# Patient Record
Sex: FEMALE | Race: WHITE | NOT HISPANIC OR LATINO | Employment: FULL TIME | RURAL
[De-identification: names, ages, dates, MRNs, and addresses within clinical notes are randomized per-mention and may not be internally consistent; named-entity substitution may affect disease eponyms.]

---

## 2018-04-04 ENCOUNTER — HISTORICAL (OUTPATIENT)
Dept: ADMINISTRATIVE | Facility: HOSPITAL | Age: 23
End: 2018-04-04

## 2018-04-09 LAB
LAB AP GENERAL CAT - HISTORICAL: NORMAL
LAB AP INTERPRETATION/RESULT - HISTORICAL: NORMAL
LAB AP SPECIMEN ADEQUACY - HISTORICAL: NORMAL
LAB AP SPECIMEN SUBMITTED - HISTORICAL: NORMAL

## 2019-01-16 ENCOUNTER — HISTORICAL (OUTPATIENT)
Dept: ADMINISTRATIVE | Facility: HOSPITAL | Age: 24
End: 2019-01-16

## 2019-01-18 LAB
LAB AP CLINICAL INFORMATION: NORMAL
LAB AP DIAGNOSIS - HISTORICAL: NORMAL
LAB AP GROSS PATHOLOGY - HISTORICAL: NORMAL
LAB AP SPECIMEN SUBMITTED - HISTORICAL: NORMAL

## 2019-12-03 ENCOUNTER — HISTORICAL (OUTPATIENT)
Dept: ADMINISTRATIVE | Facility: HOSPITAL | Age: 24
End: 2019-12-03

## 2019-12-10 LAB
LAB AP CLINICAL INFORMATION: NORMAL
LAB AP GENERAL CAT - HISTORICAL: NORMAL
LAB AP INTERPRETATION/RESULT - HISTORICAL: NORMAL
LAB AP SPECIMEN ADEQUACY - HISTORICAL: NORMAL
LAB AP SPECIMEN SUBMITTED - HISTORICAL: NORMAL

## 2021-01-05 ENCOUNTER — HISTORICAL (OUTPATIENT)
Dept: ADMINISTRATIVE | Facility: HOSPITAL | Age: 26
End: 2021-01-05

## 2021-01-12 LAB
LAB AP CLINICAL INFORMATION: NORMAL
LAB AP COMMENTS: NORMAL
LAB AP GENERAL CAT - HISTORICAL: NORMAL
LAB AP INTERPRETATION/RESULT - HISTORICAL: NEGATIVE
LAB AP SPECIMEN ADEQUACY - HISTORICAL: NORMAL
LAB AP SPECIMEN SUBMITTED - HISTORICAL: NORMAL

## 2021-05-26 ENCOUNTER — OFFICE VISIT (OUTPATIENT)
Dept: FAMILY MEDICINE | Facility: CLINIC | Age: 26
End: 2021-05-26
Payer: COMMERCIAL

## 2021-05-26 VITALS
BODY MASS INDEX: 27.79 KG/M2 | WEIGHT: 151 LBS | HEART RATE: 89 BPM | TEMPERATURE: 98 F | HEIGHT: 62 IN | SYSTOLIC BLOOD PRESSURE: 146 MMHG | DIASTOLIC BLOOD PRESSURE: 93 MMHG

## 2021-05-26 DIAGNOSIS — F90.2 ATTENTION DEFICIT HYPERACTIVITY DISORDER (ADHD), COMBINED TYPE: Primary | ICD-10-CM

## 2021-05-26 DIAGNOSIS — Z76.0 PRESCRIPTION REFILL: ICD-10-CM

## 2021-05-26 PROCEDURE — 99213 OFFICE O/P EST LOW 20 MIN: CPT | Mod: ,,, | Performed by: FAMILY MEDICINE

## 2021-05-26 PROCEDURE — 99213 PR OFFICE/OUTPT VISIT, EST, LEVL III, 20-29 MIN: ICD-10-PCS | Mod: ,,, | Performed by: FAMILY MEDICINE

## 2021-05-26 RX ORDER — DEXTROAMPHETAMINE SACCHARATE, AMPHETAMINE ASPARTATE, DEXTROAMPHETAMINE SULFATE AND AMPHETAMINE SULFATE 5; 5; 5; 5 MG/1; MG/1; MG/1; MG/1
20 TABLET ORAL 2 TIMES DAILY
COMMUNITY
Start: 2021-04-22 | End: 2021-05-26 | Stop reason: SDUPTHER

## 2021-05-26 RX ORDER — DEXTROAMPHETAMINE SACCHARATE, AMPHETAMINE ASPARTATE, DEXTROAMPHETAMINE SULFATE AND AMPHETAMINE SULFATE 5; 5; 5; 5 MG/1; MG/1; MG/1; MG/1
20 TABLET ORAL 2 TIMES DAILY
Qty: 60 TABLET | Refills: 0 | Status: SHIPPED | OUTPATIENT
Start: 2021-05-26 | End: 2021-06-22 | Stop reason: SDUPTHER

## 2021-05-26 RX ORDER — VALACYCLOVIR HYDROCHLORIDE 500 MG/1
500 TABLET, FILM COATED ORAL 2 TIMES DAILY
COMMUNITY
Start: 2021-05-10 | End: 2022-01-06 | Stop reason: SDUPTHER

## 2021-06-22 DIAGNOSIS — F90.2 ATTENTION DEFICIT HYPERACTIVITY DISORDER (ADHD), COMBINED TYPE: ICD-10-CM

## 2021-06-22 RX ORDER — DEXTROAMPHETAMINE SACCHARATE, AMPHETAMINE ASPARTATE, DEXTROAMPHETAMINE SULFATE AND AMPHETAMINE SULFATE 5; 5; 5; 5 MG/1; MG/1; MG/1; MG/1
20 TABLET ORAL 2 TIMES DAILY
Qty: 60 TABLET | Refills: 0 | Status: SHIPPED | OUTPATIENT
Start: 2021-06-22 | End: 2021-07-22 | Stop reason: SDUPTHER

## 2021-07-22 DIAGNOSIS — F90.2 ATTENTION DEFICIT HYPERACTIVITY DISORDER (ADHD), COMBINED TYPE: ICD-10-CM

## 2021-07-22 RX ORDER — DEXTROAMPHETAMINE SACCHARATE, AMPHETAMINE ASPARTATE, DEXTROAMPHETAMINE SULFATE AND AMPHETAMINE SULFATE 5; 5; 5; 5 MG/1; MG/1; MG/1; MG/1
20 TABLET ORAL 2 TIMES DAILY
Qty: 60 TABLET | Refills: 0 | Status: SHIPPED | OUTPATIENT
Start: 2021-07-22 | End: 2021-08-25 | Stop reason: SDUPTHER

## 2021-08-25 DIAGNOSIS — F90.2 ATTENTION DEFICIT HYPERACTIVITY DISORDER (ADHD), COMBINED TYPE: ICD-10-CM

## 2021-08-25 RX ORDER — DEXTROAMPHETAMINE SACCHARATE, AMPHETAMINE ASPARTATE, DEXTROAMPHETAMINE SULFATE AND AMPHETAMINE SULFATE 5; 5; 5; 5 MG/1; MG/1; MG/1; MG/1
20 TABLET ORAL 2 TIMES DAILY
Qty: 60 TABLET | Refills: 0 | Status: SHIPPED | OUTPATIENT
Start: 2021-08-25 | End: 2021-08-26 | Stop reason: SDUPTHER

## 2021-08-26 ENCOUNTER — OFFICE VISIT (OUTPATIENT)
Dept: FAMILY MEDICINE | Facility: CLINIC | Age: 26
End: 2021-08-26
Payer: COMMERCIAL

## 2021-08-26 VITALS — SYSTOLIC BLOOD PRESSURE: 146 MMHG | HEART RATE: 79 BPM | DIASTOLIC BLOOD PRESSURE: 88 MMHG

## 2021-08-26 DIAGNOSIS — F90.2 ATTENTION DEFICIT HYPERACTIVITY DISORDER (ADHD), COMBINED TYPE: Primary | Chronic | ICD-10-CM

## 2021-08-26 DIAGNOSIS — Z76.0 ISSUE OF REPEAT PRESCRIPTION: ICD-10-CM

## 2021-08-26 PROCEDURE — 99213 PR OFFICE/OUTPT VISIT, EST, LEVL III, 20-29 MIN: ICD-10-PCS | Mod: ,,, | Performed by: FAMILY MEDICINE

## 2021-08-26 PROCEDURE — 99213 OFFICE O/P EST LOW 20 MIN: CPT | Mod: ,,, | Performed by: FAMILY MEDICINE

## 2021-08-26 RX ORDER — DEXTROAMPHETAMINE SACCHARATE, AMPHETAMINE ASPARTATE, DEXTROAMPHETAMINE SULFATE AND AMPHETAMINE SULFATE 5; 5; 5; 5 MG/1; MG/1; MG/1; MG/1
20 TABLET ORAL 2 TIMES DAILY
Qty: 60 TABLET | Refills: 0 | Status: SHIPPED | OUTPATIENT
Start: 2021-08-26 | End: 2021-09-22 | Stop reason: SDUPTHER

## 2021-09-22 DIAGNOSIS — F90.2 ATTENTION DEFICIT HYPERACTIVITY DISORDER (ADHD), COMBINED TYPE: Chronic | ICD-10-CM

## 2021-09-22 RX ORDER — DEXTROAMPHETAMINE SACCHARATE, AMPHETAMINE ASPARTATE, DEXTROAMPHETAMINE SULFATE AND AMPHETAMINE SULFATE 5; 5; 5; 5 MG/1; MG/1; MG/1; MG/1
20 TABLET ORAL 2 TIMES DAILY
Qty: 60 TABLET | Refills: 0 | Status: SHIPPED | OUTPATIENT
Start: 2021-09-22 | End: 2021-10-20 | Stop reason: SDUPTHER

## 2021-10-20 DIAGNOSIS — F90.2 ATTENTION DEFICIT HYPERACTIVITY DISORDER (ADHD), COMBINED TYPE: Chronic | ICD-10-CM

## 2021-10-21 RX ORDER — DEXTROAMPHETAMINE SACCHARATE, AMPHETAMINE ASPARTATE, DEXTROAMPHETAMINE SULFATE AND AMPHETAMINE SULFATE 5; 5; 5; 5 MG/1; MG/1; MG/1; MG/1
20 TABLET ORAL 2 TIMES DAILY
Qty: 60 TABLET | Refills: 0 | Status: SHIPPED | OUTPATIENT
Start: 2021-10-21 | End: 2021-11-23 | Stop reason: SDUPTHER

## 2021-11-19 LAB
A1C: 4.5
CHOLEST SERPL-MSCNC: 173 MG/DL (ref 0–200)
HDLC SERPL-MCNC: 70 MG/DL (ref 35–70)
HIV: NORMAL
LDLC SERPL CALC-MCNC: 75 MG/DL (ref 0–160)
PAP SMEAR: NORMAL
TRIGL SERPL-MCNC: 142 MG/DL (ref 40–160)

## 2021-11-23 ENCOUNTER — OFFICE VISIT (OUTPATIENT)
Dept: FAMILY MEDICINE | Facility: CLINIC | Age: 26
End: 2021-11-23
Payer: COMMERCIAL

## 2021-11-23 VITALS
SYSTOLIC BLOOD PRESSURE: 125 MMHG | HEIGHT: 62 IN | HEART RATE: 75 BPM | BODY MASS INDEX: 27.79 KG/M2 | WEIGHT: 151 LBS | DIASTOLIC BLOOD PRESSURE: 82 MMHG

## 2021-11-23 DIAGNOSIS — F90.2 ATTENTION DEFICIT HYPERACTIVITY DISORDER (ADHD), COMBINED TYPE: Primary | Chronic | ICD-10-CM

## 2021-11-23 DIAGNOSIS — Z76.0 ISSUE OF REPEAT PRESCRIPTION: ICD-10-CM

## 2021-11-23 PROCEDURE — 99213 OFFICE O/P EST LOW 20 MIN: CPT | Mod: ,,, | Performed by: FAMILY MEDICINE

## 2021-11-23 PROCEDURE — 99213 PR OFFICE/OUTPT VISIT, EST, LEVL III, 20-29 MIN: ICD-10-PCS | Mod: ,,, | Performed by: FAMILY MEDICINE

## 2021-11-23 RX ORDER — DEXTROAMPHETAMINE SACCHARATE, AMPHETAMINE ASPARTATE, DEXTROAMPHETAMINE SULFATE AND AMPHETAMINE SULFATE 5; 5; 5; 5 MG/1; MG/1; MG/1; MG/1
20 TABLET ORAL 2 TIMES DAILY
Qty: 60 TABLET | Refills: 0 | Status: SHIPPED | OUTPATIENT
Start: 2021-11-23 | End: 2021-12-22 | Stop reason: SDUPTHER

## 2021-12-22 DIAGNOSIS — F90.2 ATTENTION DEFICIT HYPERACTIVITY DISORDER (ADHD), COMBINED TYPE: Chronic | ICD-10-CM

## 2021-12-22 RX ORDER — DEXTROAMPHETAMINE SACCHARATE, AMPHETAMINE ASPARTATE, DEXTROAMPHETAMINE SULFATE AND AMPHETAMINE SULFATE 5; 5; 5; 5 MG/1; MG/1; MG/1; MG/1
20 TABLET ORAL 2 TIMES DAILY
Qty: 60 TABLET | Refills: 0 | Status: SHIPPED | OUTPATIENT
Start: 2021-12-22 | End: 2022-01-24 | Stop reason: SDUPTHER

## 2022-01-06 ENCOUNTER — OFFICE VISIT (OUTPATIENT)
Dept: OBSTETRICS AND GYNECOLOGY | Facility: CLINIC | Age: 27
End: 2022-01-06
Payer: COMMERCIAL

## 2022-01-06 VITALS
BODY MASS INDEX: 30.22 KG/M2 | HEIGHT: 62 IN | SYSTOLIC BLOOD PRESSURE: 120 MMHG | RESPIRATION RATE: 16 BRPM | DIASTOLIC BLOOD PRESSURE: 76 MMHG | WEIGHT: 164.19 LBS

## 2022-01-06 DIAGNOSIS — Z30.40 ENCOUNTER FOR REFILL OF PRESCRIPTION FOR CONTRACEPTION: ICD-10-CM

## 2022-01-06 DIAGNOSIS — B00.9 HERPES SIMPLEX VIRUS (HSV) INFECTION: Chronic | ICD-10-CM

## 2022-01-06 DIAGNOSIS — Z12.4 SCREENING FOR MALIGNANT NEOPLASM OF THE CERVIX: ICD-10-CM

## 2022-01-06 DIAGNOSIS — Z01.419 ENCOUNTER FOR ANNUAL ROUTINE GYNECOLOGICAL EXAMINATION: ICD-10-CM

## 2022-01-06 DIAGNOSIS — Z86.19 HISTORY OF PCR DNA POSITIVE FOR HSV2: Primary | ICD-10-CM

## 2022-01-06 PROCEDURE — 88142 CYTOPATH C/V THIN LAYER: CPT | Mod: GCY | Performed by: OBSTETRICS & GYNECOLOGY

## 2022-01-06 PROCEDURE — 3008F PR BODY MASS INDEX (BMI) DOCUMENTED: ICD-10-PCS | Mod: CPTII,,, | Performed by: OBSTETRICS & GYNECOLOGY

## 2022-01-06 PROCEDURE — 3078F DIAST BP <80 MM HG: CPT | Mod: CPTII,,, | Performed by: OBSTETRICS & GYNECOLOGY

## 2022-01-06 PROCEDURE — 99395 PR PREVENTIVE VISIT,EST,18-39: ICD-10-PCS | Mod: S$PBB,,, | Performed by: OBSTETRICS & GYNECOLOGY

## 2022-01-06 PROCEDURE — 1159F MED LIST DOCD IN RCRD: CPT | Mod: CPTII,,, | Performed by: OBSTETRICS & GYNECOLOGY

## 2022-01-06 PROCEDURE — 1159F PR MEDICATION LIST DOCUMENTED IN MEDICAL RECORD: ICD-10-PCS | Mod: CPTII,,, | Performed by: OBSTETRICS & GYNECOLOGY

## 2022-01-06 PROCEDURE — 99395 PREV VISIT EST AGE 18-39: CPT | Mod: S$PBB,,, | Performed by: OBSTETRICS & GYNECOLOGY

## 2022-01-06 PROCEDURE — 3008F BODY MASS INDEX DOCD: CPT | Mod: CPTII,,, | Performed by: OBSTETRICS & GYNECOLOGY

## 2022-01-06 PROCEDURE — 3078F PR MOST RECENT DIASTOLIC BLOOD PRESSURE < 80 MM HG: ICD-10-PCS | Mod: CPTII,,, | Performed by: OBSTETRICS & GYNECOLOGY

## 2022-01-06 PROCEDURE — 1160F RVW MEDS BY RX/DR IN RCRD: CPT | Mod: CPTII,,, | Performed by: OBSTETRICS & GYNECOLOGY

## 2022-01-06 PROCEDURE — 3074F PR MOST RECENT SYSTOLIC BLOOD PRESSURE < 130 MM HG: ICD-10-PCS | Mod: CPTII,,, | Performed by: OBSTETRICS & GYNECOLOGY

## 2022-01-06 PROCEDURE — 1160F PR REVIEW ALL MEDS BY PRESCRIBER/CLIN PHARMACIST DOCUMENTED: ICD-10-PCS | Mod: CPTII,,, | Performed by: OBSTETRICS & GYNECOLOGY

## 2022-01-06 PROCEDURE — 3074F SYST BP LT 130 MM HG: CPT | Mod: CPTII,,, | Performed by: OBSTETRICS & GYNECOLOGY

## 2022-01-06 PROCEDURE — 99213 OFFICE O/P EST LOW 20 MIN: CPT | Mod: PBBFAC | Performed by: OBSTETRICS & GYNECOLOGY

## 2022-01-06 RX ORDER — VALACYCLOVIR HYDROCHLORIDE 500 MG/1
500 TABLET, FILM COATED ORAL 2 TIMES DAILY
Qty: 10 TABLET | Refills: 4 | Status: SHIPPED | OUTPATIENT
Start: 2022-01-06 | End: 2024-02-27

## 2022-01-06 RX ORDER — NORETHINDRONE ACETATE AND ETHINYL ESTRADIOL, AND FERROUS FUMARATE 1MG-20(24)
1 KIT ORAL DAILY
COMMUNITY
Start: 2021-12-11 | End: 2022-01-06

## 2022-01-06 RX ORDER — NORETHINDRONE ACETATE AND ETHINYL ESTRADIOL, AND FERROUS FUMARATE 1MG-20(24)
1 KIT ORAL DAILY
Qty: 28 CAPSULE | Refills: 11 | Status: SHIPPED | OUTPATIENT
Start: 2022-01-06 | End: 2022-02-07 | Stop reason: SDUPTHER

## 2022-01-06 NOTE — PROGRESS NOTES
Amy Rivera is a 26 y.o. female seen today for annual routine Pap and checkup.  Patient is also requesting refill of her contraception pill and Valtrex.      SUBJECTIVE:   Patient is without complaints.  Menses are regular lasting  3-4 days with mild dysmenorrhea.    Past Medical History:   Diagnosis Date    Abnormal Pap smear of cervix     LGSIL in 2019    ADHD     Herpes simplex virus (HSV) infection      Family History   Problem Relation Age of Onset    Lung cancer Paternal Grandfather     Cancer Paternal Grandfather     Breast cancer Maternal Grandmother     Hypertension Father      Past Surgical History:   Procedure Laterality Date    DILATION AND CURETTAGE OF UTERUS  2013      Current Outpatient Medications   Medication Sig Dispense Refill    dextroamphetamine-amphetamine (ADDERALL) 20 mg tablet Take 1 tablet (20 mg total) by mouth 2 (two) times daily. 60 tablet 0    norethindrone-e.estradioL-iron (TAYTULLA) 1 mg-20 mcg (24)/75 mg (4) Cap Take 1 tablet by mouth once daily. 28 capsule 11    valACYclovir (VALTREX) 500 MG tablet Take 1 tablet (500 mg total) by mouth 2 (two) times daily. for 5 days 10 tablet 4     No current facility-administered medications for this visit.     Allergies: Codeine   Patient's last menstrual period was 12/10/2021.  Review of Systems   Constitutional: Negative for activity change, appetite change, chills, fatigue, fever and unexpected weight change.   Eyes: Negative for visual disturbance.   Respiratory: Negative for cough, shortness of breath and wheezing.    Cardiovascular: Negative for chest pain and claudication.   Gastrointestinal: Negative for abdominal pain, change in bowel habit, constipation, diarrhea, vomiting, reflux and change in bowel habit.   Endocrine: Negative for cold intolerance, heat intolerance and polydipsia.   Genitourinary: Negative for bladder incontinence, difficulty urinating, dysuria, enuresis, flank pain, frequency, menstrual irregularity,  "menstrual problem, pelvic pain, urgency and vaginal dryness.   Musculoskeletal: Negative for back pain.   Integumentary:  Negative for breast mass, breast discharge and breast tenderness.   Neurological: Negative for headaches.   Breast: Negative for mass and tenderness       OBJECTIVE:   /76   Resp 16   Ht 5' 2" (1.575 m)   Wt 74.5 kg (164 lb 3.2 oz)   LMP 12/10/2021 Comment: x4 days; regular cycles  BMI 30.03 kg/m²   Physical Exam  Vitals reviewed. Exam conducted with a chaperone present.   Constitutional:       Appearance: Normal appearance.   HENT:      Head: Normocephalic and atraumatic.      Mouth/Throat:      Mouth: Mucous membranes are moist.   Eyes:      Extraocular Movements: Extraocular movements intact.      Pupils: Pupils are equal, round, and reactive to light.   Cardiovascular:      Rate and Rhythm: Normal rate and regular rhythm.      Pulses: Normal pulses.      Heart sounds: Normal heart sounds.   Pulmonary:      Effort: Pulmonary effort is normal.      Breath sounds: Normal breath sounds.   Abdominal:      General: Abdomen is flat. Bowel sounds are normal.      Palpations: Abdomen is soft.   Genitourinary:     General: Normal vulva.      Exam position: Lithotomy position.      Vagina: Normal. No vaginal discharge.      Cervix: Normal.      Uterus: Normal.       Adnexa: Right adnexa normal and left adnexa normal.        Right: No tenderness.          Left: No tenderness.        Comments: Pap smear obtained  Musculoskeletal:         General: Normal range of motion.      Cervical back: Normal range of motion.   Skin:     General: Skin is warm and dry.   Neurological:      General: No focal deficit present.      Mental Status: She is alert and oriented to person, place, and time.   Psychiatric:         Mood and Affect: Mood normal.         Behavior: Behavior normal.         Thought Content: Thought content normal.         Judgment: Judgment normal.            ASSESSMENT/PLAN:  History of PCR " DNA positive for HSV2  -     valACYclovir (VALTREX) 500 MG tablet; Take 1 tablet (500 mg total) by mouth 2 (two) times daily. for 5 days  Dispense: 10 tablet; Refill: 4    Screening for malignant neoplasm of the cervix  -     ThinPrep Pap Test    Encounter for annual routine gynecological examination    Encounter for refill of prescription for contraception  -     norethindrone-e.estradioL-iron (TAYTULLA) 1 mg-20 mcg (24)/75 mg (4) Cap; Take 1 tablet by mouth once daily.  Dispense: 28 capsule; Refill: 11    Herpes simplex virus (HSV) infection

## 2022-01-07 LAB
GH SERPL-MCNC: NORMAL NG/ML
INSULIN SERPL-ACNC: NORMAL U[IU]/ML
LAB AP CLINICAL INFORMATION: NORMAL
LAB AP GYN INTERPRETATION: NEGATIVE
LAB AP PAP DISCLAIMER COMMENTS: NORMAL
RENIN PLAS-CCNC: NORMAL NG/ML/H

## 2022-01-24 DIAGNOSIS — F90.2 ATTENTION DEFICIT HYPERACTIVITY DISORDER (ADHD), COMBINED TYPE: Chronic | ICD-10-CM

## 2022-01-25 RX ORDER — DEXTROAMPHETAMINE SACCHARATE, AMPHETAMINE ASPARTATE, DEXTROAMPHETAMINE SULFATE AND AMPHETAMINE SULFATE 5; 5; 5; 5 MG/1; MG/1; MG/1; MG/1
20 TABLET ORAL 2 TIMES DAILY
Qty: 60 TABLET | Refills: 0 | Status: SHIPPED | OUTPATIENT
Start: 2022-01-25 | End: 2022-02-24 | Stop reason: SDUPTHER

## 2022-02-07 DIAGNOSIS — Z30.40 ENCOUNTER FOR REFILL OF PRESCRIPTION FOR CONTRACEPTION: ICD-10-CM

## 2022-02-07 NOTE — TELEPHONE ENCOUNTER
Pt requesting refill of ocp. After speaking with pharmacy, they said pt is out of refills. Verbal given to pharmacy.

## 2022-02-24 ENCOUNTER — OFFICE VISIT (OUTPATIENT)
Dept: FAMILY MEDICINE | Facility: CLINIC | Age: 27
End: 2022-02-24
Payer: COMMERCIAL

## 2022-02-24 VITALS — SYSTOLIC BLOOD PRESSURE: 140 MMHG | HEART RATE: 71 BPM | DIASTOLIC BLOOD PRESSURE: 90 MMHG

## 2022-02-24 DIAGNOSIS — F90.2 ATTENTION DEFICIT HYPERACTIVITY DISORDER (ADHD), COMBINED TYPE: Primary | Chronic | ICD-10-CM

## 2022-02-24 DIAGNOSIS — Z13.1 SCREENING FOR DIABETES MELLITUS: ICD-10-CM

## 2022-02-24 DIAGNOSIS — Z76.0 ISSUE OF REPEAT PRESCRIPTION: ICD-10-CM

## 2022-02-24 PROCEDURE — 3077F PR MOST RECENT SYSTOLIC BLOOD PRESSURE >= 140 MM HG: ICD-10-PCS | Mod: CPTII,,, | Performed by: FAMILY MEDICINE

## 2022-02-24 PROCEDURE — 1159F MED LIST DOCD IN RCRD: CPT | Mod: CPTII,,, | Performed by: FAMILY MEDICINE

## 2022-02-24 PROCEDURE — 1159F PR MEDICATION LIST DOCUMENTED IN MEDICAL RECORD: ICD-10-PCS | Mod: CPTII,,, | Performed by: FAMILY MEDICINE

## 2022-02-24 PROCEDURE — 3080F DIAST BP >= 90 MM HG: CPT | Mod: CPTII,,, | Performed by: FAMILY MEDICINE

## 2022-02-24 PROCEDURE — 3080F PR MOST RECENT DIASTOLIC BLOOD PRESSURE >= 90 MM HG: ICD-10-PCS | Mod: CPTII,,, | Performed by: FAMILY MEDICINE

## 2022-02-24 PROCEDURE — 3077F SYST BP >= 140 MM HG: CPT | Mod: CPTII,,, | Performed by: FAMILY MEDICINE

## 2022-02-24 PROCEDURE — 99213 OFFICE O/P EST LOW 20 MIN: CPT | Mod: ,,, | Performed by: FAMILY MEDICINE

## 2022-02-24 PROCEDURE — 99213 PR OFFICE/OUTPT VISIT, EST, LEVL III, 20-29 MIN: ICD-10-PCS | Mod: ,,, | Performed by: FAMILY MEDICINE

## 2022-02-24 RX ORDER — DEXTROAMPHETAMINE SACCHARATE, AMPHETAMINE ASPARTATE, DEXTROAMPHETAMINE SULFATE AND AMPHETAMINE SULFATE 5; 5; 5; 5 MG/1; MG/1; MG/1; MG/1
20 TABLET ORAL 2 TIMES DAILY
Qty: 60 TABLET | Refills: 0 | Status: SHIPPED | OUTPATIENT
Start: 2022-02-24 | End: 2022-03-24 | Stop reason: SDUPTHER

## 2022-02-24 NOTE — PROGRESS NOTES
Dennys Reed MD   [facility]  MEDICAL GROUP [facility] MEDICINE  [address] [place] [address]  [phone]      PATIENT NAME: Amy Rivera  : 1995  DATE: 22  MRN: [id number]      Billing Provider: Dennys Reed MD  Level of Service:   Patient PCP Information       Provider PCP Type    Dennys Reed MD General            Reason for Visit / Chief Complaint: Follow-up (Medication refills )       Update PCP  Update Chief Complaint         History of Present Illness / Problem Focused Workflow     Amy Rivera presents to the clinic with Follow-up (Medication refills )       27 yo WF treated for ADHD.  Doing well on current dosage of medication.  Here today for refill.  No new/acute complaints.        Review of Systems     Review of Systems   Constitutional: Negative for activity change, chills and fever.   HENT: Negative for sore throat.    Eyes: Negative for pain.   Respiratory: Negative for cough, chest tightness and shortness of breath.    Cardiovascular: Negative for chest pain and palpitations.   Gastrointestinal: Negative for abdominal pain.   Neurological: Negative for dizziness, syncope and weakness.   Psychiatric/Behavioral: Negative for confusion.        Medical / Social / Family History     Past Medical History:   Diagnosis Date    Abnormal Pap smear of cervix     LGSIL in 2019    ADHD     Herpes simplex virus (HSV) infection        Past Surgical History:   Procedure Laterality Date    DILATION AND CURETTAGE OF UTERUS         Social History    reports that she has never smoked. She has never used smokeless tobacco. She reports previous alcohol use. She reports that she does not use drugs.   Social History     Tobacco Use    Smoking status: Never Smoker    Smokeless tobacco: Never Used   Substance Use Topics    Alcohol use: Not Currently    Drug use: Never       Family History  Family History   Problem Relation Age of Onset     Lung cancer Paternal Grandfather     Cancer Paternal Grandfather     Breast cancer Maternal Grandmother     Hypertension Father        Medications and Allergies     Medications  Outpatient Medications Marked as Taking for the 2/24/22 encounter (Office Visit) with Dennys Reed MD   Medication Sig Dispense Refill    norethindrone-e.estradioL-iron (TAYTULLA) 1 mg-20 mcg (24)/75 mg (4) Cap Take 1 tablet by mouth once daily. 28 capsule 11    valACYclovir (VALTREX) 500 MG tablet Take 1 tablet (500 mg total) by mouth 2 (two) times daily. for 5 days 10 tablet 4    [DISCONTINUED] dextroamphetamine-amphetamine (ADDERALL) 20 mg tablet Take 1 tablet (20 mg total) by mouth 2 (two) times daily. 60 tablet 0       Allergies  Review of patient's allergies indicates:   Allergen Reactions    Codeine        Physical Examination     Vitals:    02/24/22 1512   BP: (!) 140/90   Pulse: 71     Physical Exam  Vitals reviewed.   Constitutional:       Appearance: Normal appearance.   HENT:      Head: Normocephalic and atraumatic.   Eyes:      Extraocular Movements: Extraocular movements intact.      Conjunctiva/sclera: Conjunctivae normal.      Pupils: Pupils are equal, round, and reactive to light.   Cardiovascular:      Rate and Rhythm: Normal rate and regular rhythm.      Heart sounds: Normal heart sounds.   Pulmonary:      Effort: Pulmonary effort is normal.      Breath sounds: Normal breath sounds.   Musculoskeletal:         General: Normal range of motion.      Cervical back: Normal range of motion.   Skin:     General: Skin is warm and dry.   Neurological:      General: No focal deficit present.      Mental Status: She is alert and oriented to person, place, and time.   Psychiatric:         Mood and Affect: Mood normal.         Behavior: Behavior normal.          Assessment and Plan (including Health Maintenance)      Problem List  Smart Sets  Document Outside HM   :    Plan:         Health Maintenance Due   Topic Date Due     Hemoglobin A1c  02/24/2022       Problem List Items Addressed This Visit        Psychiatric    Attention deficit hyperactivity disorder (ADHD), combined type - Primary (Chronic)    Relevant Medications    dextroamphetamine-amphetamine (ADDERALL) 20 mg tablet      Other Visit Diagnoses     Screening for diabetes mellitus        Relevant Orders    Hemoglobin A1C    Issue of repeat prescription              Health Maintenance Topics with due status: Not Due       Topic Last Completion Date    TETANUS VACCINE 11/15/2018    Pap Smear 01/12/2021    COVID-19 Vaccine 12/22/2021       Future Appointments   Date Time Provider Department Center   2/28/2023  9:00 AM Dennys Reed MD [facility]            Signature:  Dennys Reed MD  [facility]  MEDICAL GROUP [facility] - FAMILY MEDICINE  [address] [place] [address]  [phone]    Date of encounter: 2/24/22

## 2022-03-24 DIAGNOSIS — F90.2 ATTENTION DEFICIT HYPERACTIVITY DISORDER (ADHD), COMBINED TYPE: Chronic | ICD-10-CM

## 2022-03-24 RX ORDER — DEXTROAMPHETAMINE SACCHARATE, AMPHETAMINE ASPARTATE, DEXTROAMPHETAMINE SULFATE AND AMPHETAMINE SULFATE 5; 5; 5; 5 MG/1; MG/1; MG/1; MG/1
20 TABLET ORAL 2 TIMES DAILY
Qty: 60 TABLET | Refills: 0 | Status: SHIPPED | OUTPATIENT
Start: 2022-03-24 | End: 2022-04-28 | Stop reason: SDUPTHER

## 2022-04-28 DIAGNOSIS — F90.2 ATTENTION DEFICIT HYPERACTIVITY DISORDER (ADHD), COMBINED TYPE: Chronic | ICD-10-CM

## 2022-04-28 RX ORDER — DEXTROAMPHETAMINE SACCHARATE, AMPHETAMINE ASPARTATE, DEXTROAMPHETAMINE SULFATE AND AMPHETAMINE SULFATE 5; 5; 5; 5 MG/1; MG/1; MG/1; MG/1
20 TABLET ORAL 2 TIMES DAILY
Qty: 60 TABLET | Refills: 0 | Status: SHIPPED | OUTPATIENT
Start: 2022-04-28 | End: 2022-05-31 | Stop reason: SDUPTHER

## 2022-05-10 RX ORDER — NORETHINDRONE ACETATE AND ETHINYL ESTRADIOL, AND FERROUS FUMARATE 1MG-20(24)
1 KIT ORAL DAILY
Qty: 28 CAPSULE | Refills: 11 | Status: SHIPPED | OUTPATIENT
Start: 2022-05-10 | End: 2024-02-27

## 2022-05-31 ENCOUNTER — OFFICE VISIT (OUTPATIENT)
Dept: FAMILY MEDICINE | Facility: CLINIC | Age: 27
End: 2022-05-31
Payer: COMMERCIAL

## 2022-05-31 VITALS
SYSTOLIC BLOOD PRESSURE: 128 MMHG | BODY MASS INDEX: 30.18 KG/M2 | HEART RATE: 100 BPM | DIASTOLIC BLOOD PRESSURE: 84 MMHG | WEIGHT: 164 LBS | HEIGHT: 62 IN

## 2022-05-31 DIAGNOSIS — Z76.0 ISSUE OF REPEAT PRESCRIPTION: ICD-10-CM

## 2022-05-31 DIAGNOSIS — F90.2 ATTENTION DEFICIT HYPERACTIVITY DISORDER (ADHD), COMBINED TYPE: Primary | Chronic | ICD-10-CM

## 2022-05-31 PROCEDURE — 3079F DIAST BP 80-89 MM HG: CPT | Mod: CPTII,,, | Performed by: FAMILY MEDICINE

## 2022-05-31 PROCEDURE — 3008F BODY MASS INDEX DOCD: CPT | Mod: CPTII,,, | Performed by: FAMILY MEDICINE

## 2022-05-31 PROCEDURE — 1159F PR MEDICATION LIST DOCUMENTED IN MEDICAL RECORD: ICD-10-PCS | Mod: CPTII,,, | Performed by: FAMILY MEDICINE

## 2022-05-31 PROCEDURE — 3008F PR BODY MASS INDEX (BMI) DOCUMENTED: ICD-10-PCS | Mod: CPTII,,, | Performed by: FAMILY MEDICINE

## 2022-05-31 PROCEDURE — 99213 PR OFFICE/OUTPT VISIT, EST, LEVL III, 20-29 MIN: ICD-10-PCS | Mod: ,,, | Performed by: FAMILY MEDICINE

## 2022-05-31 PROCEDURE — 1160F RVW MEDS BY RX/DR IN RCRD: CPT | Mod: CPTII,,, | Performed by: FAMILY MEDICINE

## 2022-05-31 PROCEDURE — 3079F PR MOST RECENT DIASTOLIC BLOOD PRESSURE 80-89 MM HG: ICD-10-PCS | Mod: CPTII,,, | Performed by: FAMILY MEDICINE

## 2022-05-31 PROCEDURE — 1159F MED LIST DOCD IN RCRD: CPT | Mod: CPTII,,, | Performed by: FAMILY MEDICINE

## 2022-05-31 PROCEDURE — 3074F SYST BP LT 130 MM HG: CPT | Mod: CPTII,,, | Performed by: FAMILY MEDICINE

## 2022-05-31 PROCEDURE — 1160F PR REVIEW ALL MEDS BY PRESCRIBER/CLIN PHARMACIST DOCUMENTED: ICD-10-PCS | Mod: CPTII,,, | Performed by: FAMILY MEDICINE

## 2022-05-31 PROCEDURE — 99213 OFFICE O/P EST LOW 20 MIN: CPT | Mod: ,,, | Performed by: FAMILY MEDICINE

## 2022-05-31 PROCEDURE — 3074F PR MOST RECENT SYSTOLIC BLOOD PRESSURE < 130 MM HG: ICD-10-PCS | Mod: CPTII,,, | Performed by: FAMILY MEDICINE

## 2022-05-31 RX ORDER — DEXTROAMPHETAMINE SACCHARATE, AMPHETAMINE ASPARTATE, DEXTROAMPHETAMINE SULFATE AND AMPHETAMINE SULFATE 5; 5; 5; 5 MG/1; MG/1; MG/1; MG/1
20 TABLET ORAL 2 TIMES DAILY
Qty: 60 TABLET | Refills: 0 | Status: SHIPPED | OUTPATIENT
Start: 2022-05-31 | End: 2022-06-28 | Stop reason: SDUPTHER

## 2022-05-31 NOTE — PROGRESS NOTES
Answers for HPI/ROS submitted by the patient on 2022  activity change: No  unexpected weight change: No  neck pain: No  hearing loss: No  rhinorrhea: No  trouble swallowing: No  eye discharge: No  visual disturbance: No  chest tightness: No  wheezing: No  chest pain: No  palpitations: No  blood in stool: No  constipation: No  vomiting: No  diarrhea: No  polydipsia: No  polyuria: No  difficulty urinating: No  hematuria: No  menstrual problem: No  dysuria: No  joint swelling: No  arthralgias: No  headaches: No  weakness: No  confusion: No  dysphoric mood: No       Dennys Reed MD   [facility] [address]  [phone]      PATIENT NAME: Amy Rivera  : 1995  DATE: 22  MRN: [id number]      Billing Provider: Dennys Reed MD  Level of Service:   Patient PCP Information       Provider PCP Type    Dennys Reed MD General            Reason for Visit / Chief Complaint: Medication Refill       Update PCP  Update Chief Complaint         History of Present Illness / Problem Focused Workflow     Amy Rivera presents to the clinic with Medication Refill       28 yo WF here for follow up treatment of ADD.  Says that she is doing well on current dosage of medication and has no new/acute complaints.  Here for refill only.    Medication Refill  Pertinent negatives include no arthralgias, chest pain, headaches, joint swelling, neck pain, vomiting or weakness.       Review of Systems     Review of Systems   Constitutional: Negative for activity change and unexpected weight change.   HENT: Negative for hearing loss, rhinorrhea and trouble swallowing.    Eyes: Negative for discharge and visual disturbance.   Respiratory: Negative for chest tightness and wheezing.    Cardiovascular: Negative for chest pain and palpitations.   Gastrointestinal: Negative for blood in stool, constipation, diarrhea and vomiting.    Endocrine: Negative for polydipsia and polyuria.   Genitourinary: Negative for difficulty urinating, dysuria, hematuria and menstrual problem.   Musculoskeletal: Negative for arthralgias, joint swelling and neck pain.   Neurological: Negative for weakness and headaches.   Psychiatric/Behavioral: Negative for confusion and dysphoric mood.        Medical / Social / Family History     Past Medical History:   Diagnosis Date    Abnormal Pap smear of cervix     LGSIL in 2019    ADHD     Herpes simplex virus (HSV) infection        Past Surgical History:   Procedure Laterality Date    DILATION AND CURETTAGE OF UTERUS  2013       Social History    reports that she has never smoked. She has never used smokeless tobacco. She reports previous alcohol use. She reports that she does not use drugs.   Social History     Tobacco Use    Smoking status: Never Smoker    Smokeless tobacco: Never Used   Substance Use Topics    Alcohol use: Not Currently    Drug use: Never       Family History  Family History   Problem Relation Age of Onset    Lung cancer Paternal Grandfather     Cancer Paternal Grandfather     Breast cancer Maternal Grandmother     Hypertension Father        Medications and Allergies     Medications  No outpatient medications have been marked as taking for the 5/31/22 encounter (Office Visit) with Dennys Reed MD.       Allergies  Review of patient's allergies indicates:   Allergen Reactions    Codeine        Physical Examination     Vitals:    05/31/22 0937   BP: 128/84   Pulse: 100     Physical Exam  Vitals reviewed.   Constitutional:       Appearance: Normal appearance.   HENT:      Head: Normocephalic and atraumatic.   Eyes:      Extraocular Movements: Extraocular movements intact.      Conjunctiva/sclera: Conjunctivae normal.      Pupils: Pupils are equal, round, and reactive to light.   Cardiovascular:      Rate and Rhythm: Normal rate and regular rhythm.      Heart sounds: Normal heart sounds.    Pulmonary:      Effort: Pulmonary effort is normal.      Breath sounds: Normal breath sounds.   Musculoskeletal:         General: Normal range of motion.      Cervical back: Normal range of motion.   Skin:     General: Skin is warm and dry.   Neurological:      General: No focal deficit present.      Mental Status: She is alert and oriented to person, place, and time.   Psychiatric:         Mood and Affect: Mood normal.         Behavior: Behavior normal.          Assessment and Plan (including Health Maintenance)      Problem List  Smart Sets  Document Outside HM   :    Plan:         Health Maintenance Due   Topic Date Due    Hemoglobin A1c  02/24/2022    COVID-19 Vaccine (3 - Booster for Moderna series) 05/22/2022       Problem List Items Addressed This Visit        Psychiatric    Attention deficit hyperactivity disorder (ADHD), combined type - Primary (Chronic)    Relevant Medications    dextroamphetamine-amphetamine (ADDERALL) 20 mg tablet      Other Visit Diagnoses     Issue of repeat prescription              Health Maintenance Topics with due status: Not Due       Topic Last Completion Date    Influenza Vaccine 10/29/2018    TETANUS VACCINE 11/15/2018    Pap Smear 01/06/2022       No future appointments.         Signature:  MD THOM[facility]  MEDICAL GROUP [facility] FAMILY MEDICINE  [address] [place] [id number]  [phone]    Date of encounter: 5/31/22

## 2022-06-28 DIAGNOSIS — F90.2 ATTENTION DEFICIT HYPERACTIVITY DISORDER (ADHD), COMBINED TYPE: Chronic | ICD-10-CM

## 2022-06-28 RX ORDER — DEXTROAMPHETAMINE SACCHARATE, AMPHETAMINE ASPARTATE, DEXTROAMPHETAMINE SULFATE AND AMPHETAMINE SULFATE 5; 5; 5; 5 MG/1; MG/1; MG/1; MG/1
20 TABLET ORAL 2 TIMES DAILY
Qty: 60 TABLET | Refills: 0 | Status: SHIPPED | OUTPATIENT
Start: 2022-06-28 | End: 2022-07-28 | Stop reason: SDUPTHER

## 2022-07-28 DIAGNOSIS — F90.2 ATTENTION DEFICIT HYPERACTIVITY DISORDER (ADHD), COMBINED TYPE: Chronic | ICD-10-CM

## 2022-07-28 RX ORDER — DEXTROAMPHETAMINE SACCHARATE, AMPHETAMINE ASPARTATE, DEXTROAMPHETAMINE SULFATE AND AMPHETAMINE SULFATE 5; 5; 5; 5 MG/1; MG/1; MG/1; MG/1
20 TABLET ORAL 2 TIMES DAILY
Qty: 60 TABLET | Refills: 0 | Status: SHIPPED | OUTPATIENT
Start: 2022-07-28 | End: 2022-08-16 | Stop reason: SDUPTHER

## 2022-08-16 DIAGNOSIS — F90.2 ATTENTION DEFICIT HYPERACTIVITY DISORDER (ADHD), COMBINED TYPE: Chronic | ICD-10-CM

## 2022-08-16 RX ORDER — DEXTROAMPHETAMINE SACCHARATE, AMPHETAMINE ASPARTATE, DEXTROAMPHETAMINE SULFATE AND AMPHETAMINE SULFATE 5; 5; 5; 5 MG/1; MG/1; MG/1; MG/1
20 TABLET ORAL 2 TIMES DAILY
Qty: 60 TABLET | Refills: 0 | Status: SHIPPED | OUTPATIENT
Start: 2022-08-16 | End: 2024-02-27 | Stop reason: SDUPTHER

## 2024-02-27 ENCOUNTER — OFFICE VISIT (OUTPATIENT)
Dept: FAMILY MEDICINE | Facility: CLINIC | Age: 29
End: 2024-02-27
Payer: COMMERCIAL

## 2024-02-27 VITALS
SYSTOLIC BLOOD PRESSURE: 155 MMHG | DIASTOLIC BLOOD PRESSURE: 93 MMHG | WEIGHT: 185 LBS | HEART RATE: 121 BPM | HEIGHT: 62 IN | BODY MASS INDEX: 34.04 KG/M2

## 2024-02-27 DIAGNOSIS — F90.2 ATTENTION DEFICIT HYPERACTIVITY DISORDER (ADHD), COMBINED TYPE: Primary | Chronic | ICD-10-CM

## 2024-02-27 DIAGNOSIS — R79.9 ABNORMAL BLOOD CHEMISTRY: ICD-10-CM

## 2024-02-27 DIAGNOSIS — Z79.899 PRESCRIPTION DRUG STARTED: ICD-10-CM

## 2024-02-27 DIAGNOSIS — R03.0 ELEVATED BLOOD PRESSURE READING: ICD-10-CM

## 2024-02-27 PROCEDURE — 99214 OFFICE O/P EST MOD 30 MIN: CPT | Mod: ,,, | Performed by: FAMILY MEDICINE

## 2024-02-27 PROCEDURE — 83036 HEMOGLOBIN GLYCOSYLATED A1C: CPT | Mod: ,,, | Performed by: CLINICAL MEDICAL LABORATORY

## 2024-02-27 PROCEDURE — 80048 BASIC METABOLIC PNL TOTAL CA: CPT | Mod: ,,, | Performed by: CLINICAL MEDICAL LABORATORY

## 2024-02-27 PROCEDURE — 84443 ASSAY THYROID STIM HORMONE: CPT | Mod: ,,, | Performed by: CLINICAL MEDICAL LABORATORY

## 2024-02-27 PROCEDURE — 85025 COMPLETE CBC W/AUTO DIFF WBC: CPT | Mod: ,,, | Performed by: CLINICAL MEDICAL LABORATORY

## 2024-02-27 RX ORDER — DEXTROAMPHETAMINE SACCHARATE, AMPHETAMINE ASPARTATE, DEXTROAMPHETAMINE SULFATE AND AMPHETAMINE SULFATE 5; 5; 5; 5 MG/1; MG/1; MG/1; MG/1
20 TABLET ORAL 2 TIMES DAILY
Qty: 60 TABLET | Refills: 0 | Status: SHIPPED | OUTPATIENT
Start: 2024-02-27 | End: 2024-03-26 | Stop reason: SDUPTHER

## 2024-02-27 NOTE — PROGRESS NOTES
Dennys Reed MD   [facility]  MEDICAL GROUP [address] [address]  [phone]      PATIENT NAME: Amy Fleming  : 1995  DATE: 24  MRN: [id number]      Billing Provider: Dennys Reed MD  Level of Service: [place] OFFICE/OUTPT VISIT, EST, LEVL IV, 30-39 MIN  Patient PCP Information       Provider PCP Type    Dennys Reed MD General            Reason for Visit / Chief Complaint: check up (Discuss starting ADHD med again)       Update PCP  Update Chief Complaint         History of Present Illness / Problem Focused Workflow     Amy Fleming presents to the clinic with check up (Discuss starting ADHD med again)       Was previously on adderall for ADHD.  She did well with no adverse side effects.  She stopped the medication when she was trying to get pregnant.  She did have a child and now has 4 children.  She works full time and is in school.  She is having difficulty with focus/attention/completing tasks.  Her OB/gyn gave her a trial of wellbutrin, but she says that it was not effective.  She is wanting to get back on her previous medication.      She monitors BP on a regular basis and is usually 120-130s systolic.        Review of Systems     Review of Systems   Constitutional:  Negative for activity change and unexpected weight change.   HENT:  Negative for hearing loss, rhinorrhea and trouble swallowing.    Eyes:  Negative for discharge and visual disturbance.   Respiratory:  Negative for chest tightness and wheezing.    Cardiovascular:  Negative for chest pain and palpitations.   Gastrointestinal:  Negative for blood in stool, constipation, diarrhea and vomiting.   Endocrine: Negative for polydipsia and polyuria.   Genitourinary:  Negative for difficulty urinating, dysuria, hematuria and menstrual problem.   Musculoskeletal:  Negative for arthralgias, joint swelling and neck pain.   Neurological:  Negative for weakness  and headaches.   Psychiatric/Behavioral:  Positive for decreased concentration. Negative for confusion and dysphoric mood.         Medical / Social / Family History     Past Medical History:   Diagnosis Date    Abnormal Pap smear of cervix     LGSIL in 2019    ADHD     Herpes simplex virus (HSV) infection        Past Surgical History:   Procedure Laterality Date     SECTION  2023    DILATION AND CURETTAGE OF UTERUS         Social History    reports that she has never smoked. She has never used smokeless tobacco. She reports that she does not currently use alcohol. She reports that she does not use drugs.   Social History     Tobacco Use    Smoking status: Never    Smokeless tobacco: Never   Substance Use Topics    Alcohol use: Not Currently    Drug use: Never       Family History  Family History   Problem Relation Age of Onset    Lung cancer Paternal Grandfather     Cancer Paternal Grandfather     Breast cancer Maternal Grandmother     Hypertension Father     Mental illness Brother        Medications and Allergies     Medications  No outpatient medications have been marked as taking for the 24 encounter (Office Visit) with Dennys Rede MD.       Allergies  Review of patient's allergies indicates:   Allergen Reactions    Codeine        Physical Examination     Vitals:    24 1349   BP: (!) 155/93   Pulse: (!) 121     Physical Exam  Vitals reviewed.   Constitutional:       Appearance: Normal appearance.   HENT:      Head: Normocephalic and atraumatic.   Eyes:      Extraocular Movements: Extraocular movements intact.      Conjunctiva/sclera: Conjunctivae normal.      Pupils: Pupils are equal, round, and reactive to light.   Cardiovascular:      Rate and Rhythm: Normal rate and regular rhythm.      Heart sounds: Normal heart sounds.   Pulmonary:      Effort: Pulmonary effort is normal.      Breath sounds: Normal breath sounds.   Musculoskeletal:         General: Normal range of motion.       Cervical back: Normal range of motion.   Skin:     General: Skin is warm and dry.   Neurological:      General: No focal deficit present.      Mental Status: She is alert and oriented to person, place, and time.   Psychiatric:         Mood and Affect: Mood normal.         Behavior: Behavior normal.         Thought Content: Thought content normal.         Judgment: Judgment normal.          Assessment and Plan (including Health Maintenance)      Problem List  Smart Sets  Document Outside HM   :    Plan: will start back on med at previous dosage.  She will monitor her blood pressure.  We will get some baseline labs today.          Health Maintenance Due   Topic Date Due    Hepatitis B Vaccines (1 of 3 - 3-dose series) Never done    MMR Vaccines (1 of 1 - Standard series) Never done    Varicella Vaccines (1 of 2 - 2-dose childhood series) Never done    DTaP/Tdap/Td Vaccines (2 - Td or Tdap) 12/13/2018    Hemoglobin A1c  02/24/2022    Influenza Vaccine (1) 09/01/2023    COVID-19 Vaccine (3 - 2023-24 season) 09/01/2023       Problem List Items Addressed This Visit          Psychiatric    Attention deficit hyperactivity disorder (ADHD), combined type - Primary (Chronic)    Relevant Medications    dextroamphetamine-amphetamine (ADDERALL) 20 mg tablet     Other Visit Diagnoses       Prescription drug started        Elevated blood pressure reading        Relevant Orders    CBC Auto Differential    Basic Metabolic Panel    Hemoglobin A1C    TSH    Abnormal blood chemistry        Relevant Orders    Hemoglobin A1C            Health Maintenance Topics with due status: Not Due       Topic Last Completion Date    TETANUS VACCINE 11/15/2018    Pap Smear 01/06/2022       No future appointments.         Signature:  MD THOMAS [facility]  MEDICAL GROUP [facility] FAMILY [address] [address]  [phone]    Date of encounter: 2/27/24

## 2024-02-28 LAB
ANION GAP SERPL CALCULATED.3IONS-SCNC: 13 MMOL/L (ref 7–16)
BASOPHILS # BLD AUTO: 0.03 K/UL (ref 0–0.2)
BASOPHILS NFR BLD AUTO: 0.4 % (ref 0–1)
BUN SERPL-MCNC: 12 MG/DL (ref 7–18)
BUN/CREAT SERPL: 15 (ref 6–20)
CALCIUM SERPL-MCNC: 9.4 MG/DL (ref 8.5–10.1)
CHLORIDE SERPL-SCNC: 108 MMOL/L (ref 98–107)
CO2 SERPL-SCNC: 22 MMOL/L (ref 21–32)
CREAT SERPL-MCNC: 0.82 MG/DL (ref 0.55–1.02)
DIFFERENTIAL METHOD BLD: ABNORMAL
EGFR (NO RACE VARIABLE) (RUSH/TITUS): 99 ML/MIN/1.73M2
EOSINOPHIL # BLD AUTO: 0.12 K/UL (ref 0–0.5)
EOSINOPHIL NFR BLD AUTO: 1.7 % (ref 1–4)
ERYTHROCYTE [DISTWIDTH] IN BLOOD BY AUTOMATED COUNT: 12.9 % (ref 11.5–14.5)
EST. AVERAGE GLUCOSE BLD GHB EST-MCNC: 97 MG/DL
GLUCOSE SERPL-MCNC: 90 MG/DL (ref 74–106)
HBA1C MFR BLD HPLC: 5 % (ref 4.5–6.6)
HCT VFR BLD AUTO: 39.9 % (ref 38–47)
HGB BLD-MCNC: 13.4 G/DL (ref 12–16)
IMM GRANULOCYTES # BLD AUTO: 0.04 K/UL (ref 0–0.04)
IMM GRANULOCYTES NFR BLD: 0.6 % (ref 0–0.4)
LYMPHOCYTES # BLD AUTO: 1.18 K/UL (ref 1–4.8)
LYMPHOCYTES NFR BLD AUTO: 16.9 % (ref 27–41)
MCH RBC QN AUTO: 27.4 PG (ref 27–31)
MCHC RBC AUTO-ENTMCNC: 33.6 G/DL (ref 32–36)
MCV RBC AUTO: 81.6 FL (ref 80–96)
MONOCYTES # BLD AUTO: 0.55 K/UL (ref 0–0.8)
MONOCYTES NFR BLD AUTO: 7.9 % (ref 2–6)
MPC BLD CALC-MCNC: 10.2 FL (ref 9.4–12.4)
NEUTROPHILS # BLD AUTO: 5.08 K/UL (ref 1.8–7.7)
NEUTROPHILS NFR BLD AUTO: 72.5 % (ref 53–65)
NRBC # BLD AUTO: 0 X10E3/UL
NRBC, AUTO (.00): 0 %
PLATELET # BLD AUTO: 363 K/UL (ref 150–400)
POTASSIUM SERPL-SCNC: 4 MMOL/L (ref 3.5–5.1)
RBC # BLD AUTO: 4.89 M/UL (ref 4.2–5.4)
SODIUM SERPL-SCNC: 139 MMOL/L (ref 136–145)
TSH SERPL DL<=0.005 MIU/L-ACNC: 2.88 UIU/ML (ref 0.36–3.74)
WBC # BLD AUTO: 7 K/UL (ref 4.5–11)

## 2024-03-26 DIAGNOSIS — F90.2 ATTENTION DEFICIT HYPERACTIVITY DISORDER (ADHD), COMBINED TYPE: Chronic | ICD-10-CM

## 2024-03-26 RX ORDER — DEXTROAMPHETAMINE SACCHARATE, AMPHETAMINE ASPARTATE, DEXTROAMPHETAMINE SULFATE AND AMPHETAMINE SULFATE 5; 5; 5; 5 MG/1; MG/1; MG/1; MG/1
20 TABLET ORAL 2 TIMES DAILY
Qty: 60 TABLET | Refills: 0 | Status: SHIPPED | OUTPATIENT
Start: 2024-03-26 | End: 2024-04-24 | Stop reason: SDUPTHER

## 2024-04-24 DIAGNOSIS — F90.2 ATTENTION DEFICIT HYPERACTIVITY DISORDER (ADHD), COMBINED TYPE: Chronic | ICD-10-CM

## 2024-04-24 RX ORDER — DEXTROAMPHETAMINE SACCHARATE, AMPHETAMINE ASPARTATE, DEXTROAMPHETAMINE SULFATE AND AMPHETAMINE SULFATE 5; 5; 5; 5 MG/1; MG/1; MG/1; MG/1
20 TABLET ORAL 2 TIMES DAILY
Qty: 60 TABLET | Refills: 0 | Status: SHIPPED | OUTPATIENT
Start: 2024-04-24 | End: 2024-05-23 | Stop reason: SDUPTHER

## 2024-05-23 RX ORDER — SERTRALINE HYDROCHLORIDE 50 MG/1
50 TABLET, FILM COATED ORAL NIGHTLY
COMMUNITY

## 2024-05-23 RX ORDER — VALACYCLOVIR HYDROCHLORIDE 1 G/1
1000 TABLET, FILM COATED ORAL DAILY
COMMUNITY
Start: 2024-03-07

## 2024-05-23 RX ORDER — METFORMIN HYDROCHLORIDE 500 MG/1
500 TABLET ORAL 3 TIMES DAILY
COMMUNITY
Start: 2024-02-01

## 2024-05-24 ENCOUNTER — OFFICE VISIT (OUTPATIENT)
Dept: FAMILY MEDICINE | Facility: CLINIC | Age: 29
End: 2024-05-24
Payer: COMMERCIAL

## 2024-05-24 VITALS
DIASTOLIC BLOOD PRESSURE: 89 MMHG | HEART RATE: 80 BPM | BODY MASS INDEX: 31.95 KG/M2 | SYSTOLIC BLOOD PRESSURE: 132 MMHG | HEIGHT: 62 IN | WEIGHT: 173.63 LBS

## 2024-05-24 DIAGNOSIS — Z76.0 ISSUE OF REPEAT PRESCRIPTION: ICD-10-CM

## 2024-05-24 DIAGNOSIS — F90.2 ATTENTION DEFICIT HYPERACTIVITY DISORDER (ADHD), COMBINED TYPE: Primary | Chronic | ICD-10-CM

## 2024-05-24 PROCEDURE — 99213 OFFICE O/P EST LOW 20 MIN: CPT | Mod: ,,, | Performed by: FAMILY MEDICINE

## 2024-05-24 RX ORDER — DEXTROAMPHETAMINE SACCHARATE, AMPHETAMINE ASPARTATE, DEXTROAMPHETAMINE SULFATE AND AMPHETAMINE SULFATE 5; 5; 5; 5 MG/1; MG/1; MG/1; MG/1
20 TABLET ORAL 2 TIMES DAILY
Qty: 60 TABLET | Refills: 0 | Status: SHIPPED | OUTPATIENT
Start: 2024-05-24

## 2024-05-24 NOTE — PROGRESS NOTES
Dennys Reed MD   [facility]  MEDICAL GROUP [facility] FAMILY [address] [address]  [phone]      PATIENT NAME: Amy Fleming  : 1995  DATE: 24  MRN: [id number]      Billing Provider: Dennys Reed MD  Level of Service:   Patient PCP Information       Provider PCP Type    Dennys Reed MD General            Reason for Visit / Chief Complaint: Medication Refill       Update PCP  Update Chief Complaint         History of Present Illness / Problem Focused Workflow     Amy Fleming presents to the clinic with Medication Refill       Follow up ADD.  Reports that she is doing well on current medication and has no new/acute complaints.  Needs med refill.      Medication Refill  Pertinent negatives include no abdominal pain, chest pain, chills, coughing, fever, sore throat or weakness.       Review of Systems     Review of Systems   Constitutional:  Negative for activity change, chills and fever.   HENT:  Negative for sore throat.    Eyes:  Negative for pain.   Respiratory:  Negative for cough, chest tightness and shortness of breath.    Cardiovascular:  Negative for chest pain and palpitations.   Gastrointestinal:  Negative for abdominal pain.   Neurological:  Negative for dizziness, syncope and weakness.   Psychiatric/Behavioral:  Negative for confusion.         Medical / Social / Family History     Past Medical History:   Diagnosis Date    Abnormal Pap smear of cervix     LGSIL in 2019    ADHD     Herpes simplex virus (HSV) infection        Past Surgical History:   Procedure Laterality Date     SECTION  2023    DILATION AND CURETTAGE OF UTERUS         Social History    reports that she has never smoked. She has never been exposed to tobacco smoke. She has never used smokeless tobacco. She reports that she does not currently use alcohol. She reports that she does not use drugs.   Social History     Tobacco Use     Smoking status: Never     Passive exposure: Never    Smokeless tobacco: Never   Substance Use Topics    Alcohol use: Not Currently    Drug use: Never       Family History  Family History   Problem Relation Name Age of Onset    Lung cancer Paternal Grandfather Lung     Cancer Paternal Grandfather Lung     Breast cancer Maternal Grandmother      Hypertension Father Néstor Fleming     Mental illness Brother Jermaine Fleming        Medications and Allergies     Medications  Outpatient Medications Marked as Taking for the 5/24/24 encounter (Office Visit) with Dennys Reed MD   Medication Sig Dispense Refill    metFORMIN (GLUCOPHAGE) 500 MG tablet Take 500 mg by mouth 3 (three) times daily.      sertraline (ZOLOFT) 50 MG tablet Take 50 mg by mouth every evening.      valACYclovir (VALTREX) 1000 MG tablet Take 1,000 mg by mouth once daily.         Allergies  Review of patient's allergies indicates:   Allergen Reactions    Codeine        Physical Examination     Vitals:    05/24/24 1313   BP: 132/89   Pulse: 80     Physical Exam  Vitals reviewed.   Constitutional:       Appearance: Normal appearance.   HENT:      Head: Normocephalic and atraumatic.   Eyes:      Extraocular Movements: Extraocular movements intact.      Conjunctiva/sclera: Conjunctivae normal.      Pupils: Pupils are equal, round, and reactive to light.   Cardiovascular:      Rate and Rhythm: Normal rate and regular rhythm.      Heart sounds: Normal heart sounds.   Pulmonary:      Effort: Pulmonary effort is normal.      Breath sounds: Normal breath sounds.   Musculoskeletal:         General: Normal range of motion.      Cervical back: Normal range of motion.   Skin:     General: Skin is warm and dry.   Neurological:      General: No focal deficit present.      Mental Status: She is alert and oriented to person, place, and time.   Psychiatric:         Mood and Affect: Mood normal.         Behavior: Behavior normal.          Assessment and Plan (including  Health Maintenance)      Problem List  Smart Sets  Document Outside HM   :    Plan: continue current care        Health Maintenance Due   Topic Date Due    MMR Vaccines (1 of 1 - Standard series) Never done    Varicella Vaccines (1 of 2 - 13+ 2-dose series) Never done    Hepatitis B Vaccines (1 of 3 - 19+ 3-dose series) Never done    DTaP/Tdap/Td Vaccines (3 - Td or Tdap) 07/26/2023    COVID-19 Vaccine (3 - 2023-24 season) 09/01/2023       Problem List Items Addressed This Visit          Psychiatric    Attention deficit hyperactivity disorder (ADHD), combined type - Primary (Chronic)    Relevant Medications    dextroamphetamine-amphetamine (ADDERALL) 20 mg tablet     Other Visit Diagnoses       Issue of repeat prescription                Health Maintenance Topics with due status: Not Due       Topic Last Completion Date    Influenza Vaccine 10/29/2018    Cervical Cancer Screening 01/06/2022    TETANUS VACCINE 01/26/2023    Hemoglobin A1c 02/27/2024       No future appointments.         Signature:  MD THOMAS [facility]  MEDICAL GROUP [facility]ILY MEDIC[facility]  [address] [place] [id number] [phone]    Date of encounter: 5/24/24

## 2024-06-20 DIAGNOSIS — F90.2 ATTENTION DEFICIT HYPERACTIVITY DISORDER (ADHD), COMBINED TYPE: Chronic | ICD-10-CM

## 2024-06-20 RX ORDER — DEXTROAMPHETAMINE SACCHARATE, AMPHETAMINE ASPARTATE, DEXTROAMPHETAMINE SULFATE AND AMPHETAMINE SULFATE 5; 5; 5; 5 MG/1; MG/1; MG/1; MG/1
20 TABLET ORAL 2 TIMES DAILY
Qty: 60 TABLET | Refills: 0 | Status: SHIPPED | OUTPATIENT
Start: 2024-06-20

## 2024-07-16 DIAGNOSIS — F90.2 ATTENTION DEFICIT HYPERACTIVITY DISORDER (ADHD), COMBINED TYPE: Chronic | ICD-10-CM

## 2024-07-16 RX ORDER — DEXTROAMPHETAMINE SACCHARATE, AMPHETAMINE ASPARTATE, DEXTROAMPHETAMINE SULFATE AND AMPHETAMINE SULFATE 5; 5; 5; 5 MG/1; MG/1; MG/1; MG/1
20 TABLET ORAL 2 TIMES DAILY
Qty: 60 TABLET | Refills: 0 | Status: SHIPPED | OUTPATIENT
Start: 2024-07-16

## 2024-08-13 DIAGNOSIS — F90.2 ATTENTION DEFICIT HYPERACTIVITY DISORDER (ADHD), COMBINED TYPE: Chronic | ICD-10-CM

## 2024-08-15 RX ORDER — DEXTROAMPHETAMINE SACCHARATE, AMPHETAMINE ASPARTATE, DEXTROAMPHETAMINE SULFATE AND AMPHETAMINE SULFATE 5; 5; 5; 5 MG/1; MG/1; MG/1; MG/1
20 TABLET ORAL 2 TIMES DAILY
Qty: 60 TABLET | Refills: 0 | Status: SHIPPED | OUTPATIENT
Start: 2024-08-15

## 2024-08-29 LAB
PAP RECOMMENDATION EXT: NORMAL
PAP SMEAR: NORMAL

## 2024-09-19 ENCOUNTER — OFFICE VISIT (OUTPATIENT)
Dept: FAMILY MEDICINE | Facility: CLINIC | Age: 29
End: 2024-09-19
Payer: COMMERCIAL

## 2024-09-19 VITALS
HEART RATE: 53 BPM | SYSTOLIC BLOOD PRESSURE: 133 MMHG | BODY MASS INDEX: 32.05 KG/M2 | WEIGHT: 174.19 LBS | HEIGHT: 62 IN | DIASTOLIC BLOOD PRESSURE: 87 MMHG | OXYGEN SATURATION: 98 %

## 2024-09-19 DIAGNOSIS — F90.2 ATTENTION DEFICIT HYPERACTIVITY DISORDER (ADHD), COMBINED TYPE: Primary | Chronic | ICD-10-CM

## 2024-09-19 DIAGNOSIS — Z76.0 MEDICATION REFILL: ICD-10-CM

## 2024-09-19 DIAGNOSIS — K82.9 GALL BLADDER DISEASE: Chronic | ICD-10-CM

## 2024-09-19 PROCEDURE — 99214 OFFICE O/P EST MOD 30 MIN: CPT | Mod: ,,, | Performed by: FAMILY MEDICINE

## 2024-09-19 RX ORDER — DEXTROAMPHETAMINE SACCHARATE, AMPHETAMINE ASPARTATE, DEXTROAMPHETAMINE SULFATE AND AMPHETAMINE SULFATE 5; 5; 5; 5 MG/1; MG/1; MG/1; MG/1
20 TABLET ORAL 2 TIMES DAILY
Qty: 60 TABLET | Refills: 0 | Status: SHIPPED | OUTPATIENT
Start: 2024-09-19

## 2024-09-19 NOTE — PROGRESS NOTES
Dennys Reed MD   [facility]  MEDICAL GROUP [address] [address]  [phone]      PATIENT NAME: Amy Fleming  : 1995  DATE: 24  MRN: [id number]      Billing Provider: Dennys Reed MD  Level of Service: [place] OFFICE/OUTPT VISIT, EST, LEVL IV, 30-39 MIN  Patient PCP Information       Provider PCP Type    Dennys Reed MD General            Reason for Visit / Chief Complaint: Medication Refill       Update PCP  Update Chief Complaint         History of Present Illness / Problem Focused Workflow     Amy Fleming presents to the clinic with Medication Refill       Follow up ADD.  Needs med refill.  Had GB removed since being seen here last.  Has followed up with surgery, Dr. Hernandez, and been released from care.      Medication Refill  Pertinent negatives include no abdominal pain, chest pain, chills, coughing, fever, sore throat or weakness.     Review of Systems     Review of Systems   Constitutional:  Negative for activity change, chills and fever.   HENT:  Negative for sore throat.    Eyes:  Negative for pain.   Respiratory:  Negative for cough, chest tightness and shortness of breath.    Cardiovascular:  Negative for chest pain and palpitations.   Gastrointestinal:  Negative for abdominal pain.        Recently had cholecystectomy   Neurological:  Negative for dizziness, syncope and weakness.   Psychiatric/Behavioral:  Negative for confusion.         Medical / Social / Family History     Past Medical History:   Diagnosis Date    Abnormal Pap smear of cervix     LGSIL in 2019    ADHD     Herpes simplex virus (HSV) infection        Past Surgical History:   Procedure Laterality Date     SECTION  2023    DILATION AND CURETTAGE OF UTERUS         Social History    reports that she has never smoked. She has never been exposed to tobacco smoke. She has never used smokeless tobacco. She reports that she does  not currently use alcohol. She reports that she does not use drugs.   Social History     Tobacco Use    Smoking status: Never     Passive exposure: Never    Smokeless tobacco: Never   Substance Use Topics    Alcohol use: Not Currently    Drug use: Never       Family History  Family History   Problem Relation Name Age of Onset    Lung cancer Paternal Grandfather Lung     Cancer Paternal Grandfather Lung     Breast cancer Maternal Grandmother      Hypertension Father Néstor Fleming     Mental illness Brother Jermaine Fleming        Medications and Allergies     Medications  Outpatient Medications Marked as Taking for the 9/19/24 encounter (Office Visit) with Dennys Reed MD   Medication Sig Dispense Refill    metFORMIN (GLUCOPHAGE) 500 MG tablet Take 500 mg by mouth 3 (three) times daily.      sertraline (ZOLOFT) 50 MG tablet Take 50 mg by mouth every evening.      valACYclovir (VALTREX) 1000 MG tablet Take 1,000 mg by mouth once daily.         Allergies  Review of patient's allergies indicates:   Allergen Reactions    Codeine        Physical Examination     Vitals:    09/19/24 1156   BP: 133/87   Pulse: (!) 53     Physical Exam  Vitals reviewed.   Constitutional:       Appearance: Normal appearance.   HENT:      Head: Normocephalic and atraumatic.   Eyes:      Extraocular Movements: Extraocular movements intact.      Conjunctiva/sclera: Conjunctivae normal.      Pupils: Pupils are equal, round, and reactive to light.   Cardiovascular:      Rate and Rhythm: Normal rate and regular rhythm.      Heart sounds: Normal heart sounds.   Pulmonary:      Effort: Pulmonary effort is normal.      Breath sounds: Normal breath sounds.   Musculoskeletal:         General: Normal range of motion.      Cervical back: Normal range of motion.   Skin:     General: Skin is warm and dry.   Neurological:      General: No focal deficit present.      Mental Status: She is alert and oriented to person, place, and time.   Psychiatric:          Mood and Affect: Mood normal.         Behavior: Behavior normal.          Assessment and Plan (including Health Maintenance)      Problem List  Smart Sets  Document Outside HM   :    Plan:         Health Maintenance Due   Topic Date Due    MMR Vaccines (1 of 1 - Standard series) Never done    Varicella Vaccines (1 of 2 - 13+ 2-dose series) Never done    Hepatitis B Vaccines (1 of 3 - 19+ 3-dose series) Never done    DTaP/Tdap/Td Vaccines (2 - Td or Tdap) 12/13/2018    Influenza Vaccine (1) 09/01/2024    COVID-19 Vaccine (3 - 2023-24 season) 09/01/2024       Problem List Items Addressed This Visit          Psychiatric    Attention deficit hyperactivity disorder (ADHD), combined type - Primary (Chronic)    Relevant Medications    dextroamphetamine-amphetamine (ADDERALL) 20 mg tablet     Other Visit Diagnoses       Medication refill        Gall bladder disease  (Chronic)       has had GB removed since seen here last            Health Maintenance Topics with due status: Not Due       Topic Last Completion Date    TETANUS VACCINE 11/15/2018    Cervical Cancer Screening 01/06/2022    Hemoglobin A1c 02/27/2024       No future appointments.         Signature:  MD THOMAS [facility]  MEDICAL GROUP [facility]ILY MEDIC[facility]  [address] [place] [address] [place] [id number]  [phone]    Date of encounter: 9/19/24

## 2024-10-02 ENCOUNTER — OFFICE VISIT (OUTPATIENT)
Dept: FAMILY MEDICINE | Facility: CLINIC | Age: 29
End: 2024-10-02
Payer: COMMERCIAL

## 2024-10-02 VITALS
OXYGEN SATURATION: 99 % | BODY MASS INDEX: 32.05 KG/M2 | HEART RATE: 64 BPM | WEIGHT: 174.19 LBS | HEIGHT: 62 IN | DIASTOLIC BLOOD PRESSURE: 79 MMHG | SYSTOLIC BLOOD PRESSURE: 123 MMHG

## 2024-10-02 DIAGNOSIS — M41.25 OTHER IDIOPATHIC SCOLIOSIS, THORACOLUMBAR REGION: Chronic | ICD-10-CM

## 2024-10-02 DIAGNOSIS — M62.838 MUSCLE SPASM: ICD-10-CM

## 2024-10-02 DIAGNOSIS — M51.362 DEGENERATION OF INTERVERTEBRAL DISC OF LUMBAR REGION WITH DISCOGENIC BACK PAIN AND LOWER EXTREMITY PAIN: Primary | Chronic | ICD-10-CM

## 2024-10-02 PROCEDURE — 99214 OFFICE O/P EST MOD 30 MIN: CPT | Mod: 25,,, | Performed by: FAMILY MEDICINE

## 2024-10-02 PROCEDURE — 96372 THER/PROPH/DIAG INJ SC/IM: CPT | Mod: ,,, | Performed by: FAMILY MEDICINE

## 2024-10-02 RX ORDER — METHYLPREDNISOLONE ACETATE 80 MG/ML
80 INJECTION, SUSPENSION INTRA-ARTICULAR; INTRALESIONAL; INTRAMUSCULAR; SOFT TISSUE
Status: COMPLETED | OUTPATIENT
Start: 2024-10-02 | End: 2024-10-02

## 2024-10-02 RX ORDER — TIZANIDINE 4 MG/1
4 TABLET ORAL EVERY 6 HOURS PRN
Qty: 20 TABLET | Refills: 1 | Status: SHIPPED | OUTPATIENT
Start: 2024-10-02 | End: 2024-10-12

## 2024-10-02 RX ADMIN — METHYLPREDNISOLONE ACETATE 80 MG: 80 INJECTION, SUSPENSION INTRA-ARTICULAR; INTRALESIONAL; INTRAMUSCULAR; SOFT TISSUE at 04:10

## 2024-10-02 NOTE — PROGRESS NOTES
Dennys Reed MD   [facility]  MEDICAL GROUP [address] [address]  [phone]      PATIENT NAME: Amy Fleming  : 1995  DATE: 10/2/24  MRN: [id number]      Billing Provider: Dennys Reed MD  Level of Service: [place] OFFICE/OUTPT VISIT, EST, LEVL IV, 30-39 MIN  Patient PCP Information       Provider PCP Type    Dennys Reed MD General            Reason for Visit / Chief Complaint: Back Pain       Update PCP  Update Chief Complaint         History of Present Illness / Problem Focused Workflow     Amy Fleming presents to the clinic with Back Pain       30 yo WF who strained back at work.  She does have scoliosis. Saw chiropractor earlier today and had xray and manipulation done.  He recommended to her that she get a anti-inflammatory injection.  Is reporting some muscle spasms.  Pain is worse on right.    Back Pain  Pertinent negatives include no numbness or weakness.       Review of Systems     Review of Systems   Musculoskeletal:  Positive for back pain and myalgias.   Neurological:  Negative for weakness and numbness.        Medical / Social / Family History     Past Medical History:   Diagnosis Date    Abnormal Pap smear of cervix     LGSIL in 2019    ADHD     Herpes simplex virus (HSV) infection        Past Surgical History:   Procedure Laterality Date     SECTION  2023    DILATION AND CURETTAGE OF UTERUS         Social History    reports that she has never smoked. She has never been exposed to tobacco smoke. She has never used smokeless tobacco. She reports that she does not currently use alcohol. She reports that she does not use drugs.   Social History     Tobacco Use    Smoking status: Never     Passive exposure: Never    Smokeless tobacco: Never   Substance Use Topics    Alcohol use: Not Currently    Drug use: Never       Family History  Family History   Problem Relation Name Age of Onset    Lung  cancer Paternal Grandfather Lung     Cancer Paternal Grandfather Lung     Breast cancer Maternal Grandmother      Hypertension Father Néstor Fleming     Mental illness Brother Jermaine Fleming        Medications and Allergies     Medications  Outpatient Medications Marked as Taking for the 10/2/24 encounter (Office Visit) with Dennys Reed MD   Medication Sig Dispense Refill    dextroamphetamine-amphetamine (ADDERALL) 20 mg tablet Take 1 tablet (20 mg total) by mouth 2 (two) times daily. 60 tablet 0    metFORMIN (GLUCOPHAGE) 500 MG tablet Take 500 mg by mouth 3 (three) times daily.      sertraline (ZOLOFT) 50 MG tablet Take 50 mg by mouth every evening.      valACYclovir (VALTREX) 1000 MG tablet Take 1,000 mg by mouth once daily.       Current Facility-Administered Medications for the 10/2/24 encounter (Office Visit) with Dennys Reed MD   Medication Dose Route Frequency Provider Last Rate Last Admin    [COMPLETED] methylPREDNISolone acetate injection 80 mg  80 mg Intramuscular 1 time in Clinic/HOD Dennys Reed MD   80 mg at 10/02/24 9365       Allergies  Review of patient's allergies indicates:   Allergen Reactions    Codeine        Physical Examination     Vitals:    10/02/24 1640   BP: 123/79   Pulse: 64     Physical Exam  Vitals reviewed.   Constitutional:       Appearance: Normal appearance.   HENT:      Head: Normocephalic and atraumatic.   Eyes:      Extraocular Movements: Extraocular movements intact.      Conjunctiva/sclera: Conjunctivae normal.      Pupils: Pupils are equal, round, and reactive to light.   Cardiovascular:      Rate and Rhythm: Normal rate and regular rhythm.      Heart sounds: Normal heart sounds.   Pulmonary:      Effort: Pulmonary effort is normal.      Breath sounds: Normal breath sounds.   Musculoskeletal:      Cervical back: Normal range of motion.      Comments: Pain with flexion and rotation of back.  Is worst with flexion.  She reports some decrease in pain with  extension.   Skin:     General: Skin is warm and dry.   Neurological:      General: No focal deficit present.      Mental Status: She is alert and oriented to person, place, and time.      Motor: No weakness (she is able to toe and heel walk without difficulty, but does report some increased pain with heel walking).   Psychiatric:         Mood and Affect: Mood normal.         Behavior: Behavior normal.          Assessment and Plan (including Health Maintenance)      Problem List  Smart IDEA SPHERE  Document Outside HM   :    Plan:         Health Maintenance Due   Topic Date Due    MMR Vaccines (1 of 1 - Standard series) Never done    Varicella Vaccines (1 of 2 - 13+ 2-dose series) Never done    Hepatitis B Vaccines (1 of 3 - 19+ 3-dose series) Never done    DTaP/Tdap/Td Vaccines (2 - Td or Tdap) 12/13/2018    Influenza Vaccine (1) 09/01/2024    COVID-19 Vaccine (3 - 2024-25 season) 09/01/2024       Problem List Items Addressed This Visit          Neuro    Other idiopathic scoliosis, thoracolumbar region (Chronic)    Degeneration of intervertebral disc of lumbar region with discogenic back pain and lower extremity pain - Primary (Chronic)    Relevant Medications    methylPREDNISolone acetate injection 80 mg (Completed)     Other Visit Diagnoses       Muscle spasm        Relevant Medications    tiZANidine (ZANAFLEX) 4 MG tablet            Health Maintenance Topics with due status: Not Due       Topic Last Completion Date    TETANUS VACCINE 11/15/2018    Cervical Cancer Screening 01/06/2022    Hemoglobin A1c 02/27/2024    RSV Vaccine (Age 60+ and Pregnant patients) Not Due       No future appointments.         Signature:  MD THOMAS [facility]  MEDICAL GROUP [facility] MEDIC[facility]  [address] [place] [id number]  [phone]    Date of encounter: 10/2/24

## 2024-10-15 DIAGNOSIS — F90.2 ATTENTION DEFICIT HYPERACTIVITY DISORDER (ADHD), COMBINED TYPE: Chronic | ICD-10-CM

## 2024-10-15 RX ORDER — DEXTROAMPHETAMINE SACCHARATE, AMPHETAMINE ASPARTATE, DEXTROAMPHETAMINE SULFATE AND AMPHETAMINE SULFATE 5; 5; 5; 5 MG/1; MG/1; MG/1; MG/1
20 TABLET ORAL 2 TIMES DAILY
Qty: 60 TABLET | Refills: 0 | Status: CANCELLED | OUTPATIENT
Start: 2024-10-15

## 2024-10-16 ENCOUNTER — OFFICE VISIT (OUTPATIENT)
Dept: FAMILY MEDICINE | Facility: CLINIC | Age: 29
End: 2024-10-16
Payer: COMMERCIAL

## 2024-10-16 VITALS
HEIGHT: 62 IN | BODY MASS INDEX: 32.02 KG/M2 | SYSTOLIC BLOOD PRESSURE: 131 MMHG | DIASTOLIC BLOOD PRESSURE: 83 MMHG | HEART RATE: 86 BPM | WEIGHT: 174 LBS

## 2024-10-16 DIAGNOSIS — F90.2 ATTENTION DEFICIT HYPERACTIVITY DISORDER (ADHD), COMBINED TYPE: Chronic | ICD-10-CM

## 2024-10-16 RX ORDER — DEXTROAMPHETAMINE SACCHARATE, AMPHETAMINE ASPARTATE, DEXTROAMPHETAMINE SULFATE AND AMPHETAMINE SULFATE 5; 5; 5; 5 MG/1; MG/1; MG/1; MG/1
20 TABLET ORAL 2 TIMES DAILY
Qty: 60 TABLET | Refills: 0 | Status: SHIPPED | OUTPATIENT
Start: 2024-10-19

## 2024-10-16 RX ORDER — DEXTROAMPHETAMINE SACCHARATE, AMPHETAMINE ASPARTATE, DEXTROAMPHETAMINE SULFATE AND AMPHETAMINE SULFATE 5; 5; 5; 5 MG/1; MG/1; MG/1; MG/1
20 TABLET ORAL 2 TIMES DAILY
Qty: 60 TABLET | Refills: 0 | Status: SHIPPED | OUTPATIENT
Start: 2024-10-19 | End: 2024-10-16

## 2024-10-16 RX ORDER — DEXTROAMPHETAMINE SACCHARATE, AMPHETAMINE ASPARTATE, DEXTROAMPHETAMINE SULFATE AND AMPHETAMINE SULFATE 5; 5; 5; 5 MG/1; MG/1; MG/1; MG/1
20 TABLET ORAL 2 TIMES DAILY
Qty: 60 TABLET | Refills: 0 | Status: SHIPPED | OUTPATIENT
Start: 2024-12-18 | End: 2024-10-16

## 2024-10-16 RX ORDER — DEXTROAMPHETAMINE SACCHARATE, AMPHETAMINE ASPARTATE, DEXTROAMPHETAMINE SULFATE AND AMPHETAMINE SULFATE 5; 5; 5; 5 MG/1; MG/1; MG/1; MG/1
20 TABLET ORAL 2 TIMES DAILY
Qty: 60 TABLET | Refills: 0 | Status: SHIPPED | OUTPATIENT
Start: 2024-11-18 | End: 2024-12-18

## 2024-10-16 RX ORDER — DEXTROAMPHETAMINE SACCHARATE, AMPHETAMINE ASPARTATE, DEXTROAMPHETAMINE SULFATE AND AMPHETAMINE SULFATE 5; 5; 5; 5 MG/1; MG/1; MG/1; MG/1
20 TABLET ORAL 2 TIMES DAILY
Qty: 60 TABLET | Refills: 0 | Status: SHIPPED | OUTPATIENT
Start: 2024-11-18 | End: 2024-10-16

## 2024-10-16 RX ORDER — DEXTROAMPHETAMINE SACCHARATE, AMPHETAMINE ASPARTATE, DEXTROAMPHETAMINE SULFATE AND AMPHETAMINE SULFATE 5; 5; 5; 5 MG/1; MG/1; MG/1; MG/1
20 TABLET ORAL 2 TIMES DAILY
Qty: 60 TABLET | Refills: 0 | Status: SHIPPED | OUTPATIENT
Start: 2024-12-18 | End: 2025-01-17

## 2024-10-16 NOTE — ASSESSMENT & PLAN NOTE
Chronic controlled on 20 mg p.o. b.i.d..  No palpitations.  No unintentional weight loss.  No complaints of insomnia.  Continue current care at this time

## 2024-10-16 NOTE — PROGRESS NOTES
"              Cornell Dennis IV, DO  [facility]  [address] [place] [address]  Phone: [phone]      Subjective     Name: Amy Fleming   Sex: female  YOB: 1995 (29 y.o.)  MRN: [id number]  Visit Date: 10/16/2024   Chief Complaint: Medication Refill        HISTORY OF PRESENT ILLNESS:    Chief Complaint   Patient presents with    Medication Refill       HPI  See tests that keep you healthy and the problem oriented documentation below.    All of your core healthy metrics are met.      Portions of this note may have been created with voice recognition software. Occasional "wrong-word" or "sound-a-like" substitutions may have occurred due to the inherent limitations of voice recognition software. Please, read the note carefully and recognize, using context, where substitutions have occurred.     PAST MEDICAL HISTORY:  Significant Diagnoses - Patient  has a past medical history of Abnormal Pap smear of cervix, ADHD, and Herpes simplex virus (HSV) infection.  Medications - Patient has a current medication list which includes the following long-term medication(s): sertraline, [START ON 2024] dextroamphetamine-amphetamine, [START ON 2024] dextroamphetamine-amphetamine, and [START ON 10/19/2024] dextroamphetamine-amphetamine.   Allergies - Patient is allergic to codeine.  Surgeries - Patient  has a past surgical history that includes Dilation and curettage of uterus () and  section (2023).  Family History - Patient family history includes Breast cancer in her maternal grandmother; Cancer in her paternal grandfather; Hypertension in her father; Lung cancer in her paternal grandfather; Mental illness in her brother.      SOCIAL HISTORY:  Tobacco - Patient  reports that she has never smoked. She has never been exposed to tobacco smoke. She has never used smokeless tobacco.   Alcohol - Patient  reports that she does not currently use alcohol. " "  Recreational Drugs - Patient  reports no history of drug use.       Review of Systems   All other systems reviewed and are negative.       Past Medical History:   Diagnosis Date    Abnormal Pap smear of cervix     LGSIL in 2019    ADHD     Herpes simplex virus (HSV) infection         Review of patient's allergies indicates:   Allergen Reactions    Codeine         Past Surgical History:   Procedure Laterality Date     SECTION  2023    DILATION AND CURETTAGE OF UTERUS          Family History   Problem Relation Name Age of Onset    Lung cancer Paternal Grandfather Lung     Cancer Paternal Grandfather Lung     Breast cancer Maternal Grandmother      Hypertension Father Néstor Fleming     Mental illness Brother Jermaine Fleming        Current Outpatient Medications   Medication Instructions    [START ON 2024] dextroamphetamine-amphetamine (ADDERALL) 20 mg tablet 20 mg, Oral, 2 times daily    [START ON 2024] dextroamphetamine-amphetamine (ADDERALL) 20 mg tablet 20 mg, Oral, 2 times daily    [START ON 10/19/2024] dextroamphetamine-amphetamine (ADDERALL) 20 mg tablet 20 mg, Oral, 2 times daily    sertraline (ZOLOFT) 50 mg, Nightly        Objective     /83   Pulse 86   Ht 5' 2" (1.575 m)   Wt 78.9 kg (174 lb)   BMI 31.83 kg/m²     Physical Exam  Constitutional:       General: She is not in acute distress.     Appearance: Normal appearance. She is not ill-appearing.   HENT:      Head: Normocephalic and atraumatic.      Right Ear: External ear normal.      Left Ear: External ear normal.   Eyes:      Extraocular Movements: Extraocular movements intact.      Conjunctiva/sclera: Conjunctivae normal.   Cardiovascular:      Rate and Rhythm: Normal rate.      Heart sounds: No murmur heard.  Pulmonary:      Effort: Pulmonary effort is normal.   Musculoskeletal:      Cervical back: Normal range of motion.   Skin:     General: Skin is warm and dry.      Coloration: Skin is not " jaundiced.      Findings: No rash.   Neurological:      Mental Status: She is alert.   Psychiatric:         Mood and Affect: Mood normal.        All recently obtained labs have been reviewed and discussed in detail with the patient.   Assessment     1. Attention deficit hyperactivity disorder (ADHD), combined type         Plan        Problem List Items Addressed This Visit       Attention deficit hyperactivity disorder (ADHD), combined type (Chronic)     Chronic controlled on 20 mg p.o. b.i.d..  No palpitations.  No unintentional weight loss.  No complaints of insomnia.  Continue current care at this time         Relevant Medications    dextroamphetamine-amphetamine (ADDERALL) 20 mg tablet (Start on 11/18/2024)    dextroamphetamine-amphetamine (ADDERALL) 20 mg tablet (Start on 12/18/2024)    dextroamphetamine-amphetamine (ADDERALL) 20 mg tablet (Start on 10/19/2024)       No follow-ups on file.    Patient advised that is symptoms worsen, they should call or report directly to local emergency department.    Cornell Dennis,   [facility]

## 2024-10-18 ENCOUNTER — PATIENT MESSAGE (OUTPATIENT)
Dept: FAMILY MEDICINE | Facility: CLINIC | Age: 29
End: 2024-10-18
Payer: COMMERCIAL

## 2024-10-24 DIAGNOSIS — F90.2 ATTENTION DEFICIT HYPERACTIVITY DISORDER (ADHD), COMBINED TYPE: Chronic | ICD-10-CM

## 2024-10-24 RX ORDER — DEXTROAMPHETAMINE SACCHARATE, AMPHETAMINE ASPARTATE, DEXTROAMPHETAMINE SULFATE AND AMPHETAMINE SULFATE 5; 5; 5; 5 MG/1; MG/1; MG/1; MG/1
20 TABLET ORAL 2 TIMES DAILY
Qty: 60 TABLET | Refills: 0 | Status: SHIPPED | OUTPATIENT
Start: 2024-10-24

## 2024-11-25 DIAGNOSIS — F90.2 ATTENTION DEFICIT HYPERACTIVITY DISORDER (ADHD), COMBINED TYPE: Chronic | ICD-10-CM

## 2024-11-26 RX ORDER — DEXTROAMPHETAMINE SACCHARATE, AMPHETAMINE ASPARTATE, DEXTROAMPHETAMINE SULFATE AND AMPHETAMINE SULFATE 5; 5; 5; 5 MG/1; MG/1; MG/1; MG/1
20 TABLET ORAL 2 TIMES DAILY
Qty: 60 TABLET | Refills: 0 | Status: SHIPPED | OUTPATIENT
Start: 2024-11-26 | End: 2024-12-26

## 2024-11-26 RX ORDER — DEXTROAMPHETAMINE SACCHARATE, AMPHETAMINE ASPARTATE, DEXTROAMPHETAMINE SULFATE AND AMPHETAMINE SULFATE 5; 5; 5; 5 MG/1; MG/1; MG/1; MG/1
20 TABLET ORAL 2 TIMES DAILY
Qty: 60 TABLET | Refills: 0 | Status: SHIPPED | OUTPATIENT
Start: 2024-12-26 | End: 2025-01-25

## 2025-01-28 ENCOUNTER — OFFICE VISIT (OUTPATIENT)
Dept: FAMILY MEDICINE | Facility: CLINIC | Age: 30
End: 2025-01-28
Payer: COMMERCIAL

## 2025-01-28 VITALS
WEIGHT: 180.31 LBS | OXYGEN SATURATION: 97 % | DIASTOLIC BLOOD PRESSURE: 89 MMHG | HEART RATE: 81 BPM | SYSTOLIC BLOOD PRESSURE: 139 MMHG | HEIGHT: 62 IN | BODY MASS INDEX: 33.18 KG/M2

## 2025-01-28 DIAGNOSIS — Z76.0 MEDICATION REFILL: ICD-10-CM

## 2025-01-28 DIAGNOSIS — F90.2 ATTENTION DEFICIT HYPERACTIVITY DISORDER (ADHD), COMBINED TYPE: Primary | Chronic | ICD-10-CM

## 2025-01-28 PROCEDURE — 99213 OFFICE O/P EST LOW 20 MIN: CPT | Mod: ,,, | Performed by: FAMILY MEDICINE

## 2025-01-28 RX ORDER — DEXTROAMPHETAMINE SACCHARATE, AMPHETAMINE ASPARTATE, DEXTROAMPHETAMINE SULFATE AND AMPHETAMINE SULFATE 5; 5; 5; 5 MG/1; MG/1; MG/1; MG/1
20 TABLET ORAL 2 TIMES DAILY
Qty: 60 TABLET | Refills: 0 | Status: SHIPPED | OUTPATIENT
Start: 2025-01-28 | End: 2025-02-27

## 2025-01-28 NOTE — PROGRESS NOTES
Dennys Reed MD   [facility]  [facility] [address] [address]  [phone]      PATIENT NAME: Amy Fleming  : 1995  DATE: 25  MRN: [id number]      Billing Provider: Dennys Reed MD  Level of Service:   Patient PCP Information       Provider PCP Type    Dennys Reed MD General            Reason for Visit / Chief Complaint: Medication Refill       Update PCP  Update Chief Complaint         History of Present Illness / Problem Focused Workflow     Amy Fleming presents to the clinic with Medication Refill       Follow up ADD.  Doing well on current med/dosage.  No new/acute complaints.  Needs med refill.      Medication Refill  Pertinent negatives include no abdominal pain, chest pain, chills, coughing, fever, sore throat or weakness.     Review of Systems     Review of Systems   Constitutional:  Negative for activity change, chills and fever.   HENT:  Negative for sore throat.    Eyes:  Negative for pain.   Respiratory:  Negative for cough, chest tightness and shortness of breath.    Cardiovascular:  Negative for chest pain and palpitations.   Gastrointestinal:  Negative for abdominal pain.   Neurological:  Negative for dizziness, syncope and weakness.   Psychiatric/Behavioral:  Negative for confusion.       Medical / Social / Family History     Past Medical History:   Diagnosis Date    Abnormal Pap smear of cervix     LGSIL in 2019    ADHD     Herpes simplex virus (HSV) infection        Past Surgical History:   Procedure Laterality Date     SECTION  2023    DILATION AND CURETTAGE OF UTERUS         Social History    reports that she has never smoked. She has never been exposed to tobacco smoke. She has never used smokeless tobacco. She reports that she does not currently use alcohol. She reports that she does not use drugs.   Social History     Tobacco Use    Smoking status: Never      Passive exposure: Never    Smokeless tobacco: Never   Substance Use Topics    Alcohol use: Not Currently    Drug use: Never       Family History  Family History   Problem Relation Name Age of Onset    Lung cancer Paternal Grandfather Lung     Cancer Paternal Grandfather Lung     Breast cancer Maternal Grandmother      Hypertension Father Néstor Fleming     Mental illness Brother Jermaine Fleming        Medications and Allergies     Medications  Outpatient Medications Marked as Taking for the 1/28/25 encounter (Office Visit) with Dennys Reed MD   Medication Sig Dispense Refill    sertraline (ZOLOFT) 50 MG tablet Take 50 mg by mouth every evening.         Allergies  Review of patient's allergies indicates:   Allergen Reactions    Codeine        Physical Examination     Vitals:    01/28/25 1313   BP: 139/89   Pulse: 81     Physical Exam  Vitals reviewed.   Constitutional:       Appearance: Normal appearance.   HENT:      Head: Normocephalic and atraumatic.   Eyes:      Extraocular Movements: Extraocular movements intact.      Conjunctiva/sclera: Conjunctivae normal.      Pupils: Pupils are equal, round, and reactive to light.   Cardiovascular:      Rate and Rhythm: Normal rate and regular rhythm.      Heart sounds: Normal heart sounds.   Pulmonary:      Effort: Pulmonary effort is normal.      Breath sounds: Normal breath sounds.   Musculoskeletal:         General: Normal range of motion.      Cervical back: Normal range of motion.   Skin:     General: Skin is warm and dry.   Neurological:      General: No focal deficit present.      Mental Status: She is alert and oriented to person, place, and time.   Psychiatric:         Mood and Affect: Mood normal.         Behavior: Behavior normal.        Assessment and Plan (including Health Maintenance)      Problem List  Smart Sets  Document Outside HM   :    Plan:         Health Maintenance Due   Topic Date Due    MMR Vaccines (1 of 1 - Standard series) Never done     Varicella Vaccines (1 of 2 - 13+ 2-dose series) Never done    Hepatitis B Vaccines (1 of 3 - 19+ 3-dose series) Never done    DTaP/Tdap/Td Vaccines (2 - Td or Tdap) 12/13/2018    Influenza Vaccine (1) 09/01/2024    COVID-19 Vaccine (3 - 2024-25 season) 09/01/2024    Cervical Cancer Screening  01/06/2025    Hemoglobin A1c  02/27/2025       Problem List Items Addressed This Visit          Psychiatric    Attention deficit hyperactivity disorder (ADHD), combined type (Chronic)       Health Maintenance Topics with due status: Not Due       Topic Last Completion Date    TETANUS VACCINE 11/15/2018    RSV Vaccine (Age 60+ and Pregnant patients) Not Due       No future appointments.         Signature:  MD THOMAS Boyd [facility]  [facility] [place] [facility]  [address] [place] [id number]  [phone]    Date of encounter: 1/28/25

## 2025-02-26 DIAGNOSIS — F90.2 ATTENTION DEFICIT HYPERACTIVITY DISORDER (ADHD), COMBINED TYPE: Chronic | ICD-10-CM

## 2025-02-26 RX ORDER — DEXTROAMPHETAMINE SACCHARATE, AMPHETAMINE ASPARTATE, DEXTROAMPHETAMINE SULFATE AND AMPHETAMINE SULFATE 5; 5; 5; 5 MG/1; MG/1; MG/1; MG/1
20 TABLET ORAL 2 TIMES DAILY
Qty: 60 TABLET | Refills: 0 | Status: SHIPPED | OUTPATIENT
Start: 2025-02-26 | End: 2025-03-28

## 2025-02-27 ENCOUNTER — PATIENT MESSAGE (OUTPATIENT)
Dept: FAMILY MEDICINE | Facility: CLINIC | Age: 30
End: 2025-02-27

## 2025-03-25 DIAGNOSIS — F90.2 ATTENTION DEFICIT HYPERACTIVITY DISORDER (ADHD), COMBINED TYPE: Chronic | ICD-10-CM

## 2025-03-25 RX ORDER — DEXTROAMPHETAMINE SACCHARATE, AMPHETAMINE ASPARTATE, DEXTROAMPHETAMINE SULFATE AND AMPHETAMINE SULFATE 5; 5; 5; 5 MG/1; MG/1; MG/1; MG/1
20 TABLET ORAL 2 TIMES DAILY
Qty: 60 TABLET | Refills: 0 | Status: SHIPPED | OUTPATIENT
Start: 2025-03-25 | End: 2025-04-24

## 2025-04-21 ENCOUNTER — PATIENT MESSAGE (OUTPATIENT)
Dept: ADMINISTRATIVE | Facility: HOSPITAL | Age: 30
End: 2025-04-21

## 2025-04-23 ENCOUNTER — PATIENT OUTREACH (OUTPATIENT)
Facility: HOSPITAL | Age: 30
End: 2025-04-23

## 2025-04-23 NOTE — PROGRESS NOTES
Population Health Chart Review & Patient Outreach Details    2025 Campaign response, Chart review, pap not due, message sent to pt to notify cervical screening not due, and this was added to care gaps*   Further Action Needed If Patient Returns Outreach:        Health Maintenance Due   Topic Date Due    MMR Vaccines (1 of 1 - Standard series) Never done    Varicella Vaccines (1 of 2 - 13+ 2-dose series) Never done    Hepatitis B Vaccines (1 of 3 - 19+ 3-dose series) Never done    DTaP/Tdap/Td Vaccines (2 - Td or Tdap) 2018    Influenza Vaccine (1) 2024    COVID-19 Vaccine (3 - - season) 2024    Cervical Cancer Screening  2025          Updates Requested / Reviewed:     [x]  Care Everywhere    [x]     []  External Sources ([facility], [facility], [facility], etc.)    [] [facility]   [] [facility]   [] Other:    [x]  Care Team Updated   []  Removed  or Duplicate Orders   []  Immunization Reconciliation Completed / Queried    [] [place]   [] [place]   [] [place]   [] [place]      Health Maintenance Topics Addressed and Outreach Outcomes / Actions Taken:             Breast Cancer Screening []  Mammogram Order Placed    []  Mammogram Screening Scheduled    []  External Records Requested & Care Team Updated if Applicable    []  External Records Uploaded & Care Team Updated if Applicable    []  Pt Declined Scheduling Mammogram    []  Pt Will Schedule with External Provider / Order Routed & Care Team Updated if Applicable              Cervical Cancer Screening []  Pap Smear Scheduled in Primary Care or OBGYN    []  External Records Requested & Care Team Updated if Applicable       [x]  External Records Uploaded, Care Team Updated, & History Updated if Applicable    []  Patient Declined Scheduling Pap Smear    []  Patient Will Schedule with External Provider & Care Team Updated if Applicable                  Colorectal Cancer Screening []  Colonoscopy Case Request / Referral / Home Test  Order Placed    []  External Records Requested & Care Team Updated if Applicable    []  External Records Uploaded, Care Team Updated, & History Updated if Applicable    []  Patient Declined Completing Colon Cancer Screening    []  Patient Will Schedule with External Provider & Care Team Updated if Applicable    []  Fit Kit Mailed (add the SmartPhrase under additional notes)    []  Reminded Patient to Complete Home Test                Diabetic Eye Exam []  Eye Exam Screening Order Placed    []  Eye Camera Scheduled or Optometry/Ophthalmology Referral Placed    []  External Records Requested & Care Team Updated if Applicable    []  External Records Uploaded, Care Team Updated, & History Updated if Applicable    []  Patient Declined Scheduling Eye Exam    []  Patient Will Schedule with External Provider & Care Team Updated if Applicable             Blood Pressure Control []  Primary Care Follow Up Visit Scheduled     []  Remote Blood Pressure Reading Captured    []  Patient Declined Remote Reading or Scheduling Appt - Escalated to PCP    []  Patient Will Call Back or Send Portal Message with Reading                 HbA1c & Other Labs []  Overdue Lab(s) Ordered    []  Overdue Lab(s) Scheduled    []  External Records Uploaded & Care Team Updated if Applicable    []  Primary Care Follow Up Visit Scheduled     []  Reminded Patient to Complete A1c Home Test    []  Patient Declined Scheduling Labs or Will Call Back to Schedule    []  Patient Will Schedule with External Provider / Order Routed, & Care Team Updated if Applicable           Primary Care Appointment []  Primary Care Appt Scheduled    []  Patient Declined Scheduling or Will Call Back to Schedule    []  Pt Established with External Provider, Updated Care Team, & Informed Pt to Notify Payor if Applicable           Medication Adherence /    Statin Use []  Primary Care Appointment Scheduled    []  Patient Reminded to  Prescription    []  Patient Declined,  Provider Notified if Needed    []  Sent Provider Message to Review to Evaluate Pt for Statin, Add Exclusion Dx Codes, Document   Exclusion in Problem List, Change Statin Intensity Level to Moderate or High Intensity if Applicable                Osteoporosis Screening []  Dexa Order Placed    []  Dexa Appointment Scheduled    []  External Records Requested & Care Team Updated    []  External Records Uploaded, Care Team Updated, & History Updated if Applicable    []  Patient Declined Scheduling Dexa or Will Call Back to Schedule    []  Patient Will Schedule with External Provider / Order Routed & Care Team Updated if Applicable       Additional Notes:

## 2025-04-28 DIAGNOSIS — F90.2 ATTENTION DEFICIT HYPERACTIVITY DISORDER (ADHD), COMBINED TYPE: Chronic | ICD-10-CM

## 2025-04-29 RX ORDER — DEXTROAMPHETAMINE SACCHARATE, AMPHETAMINE ASPARTATE, DEXTROAMPHETAMINE SULFATE AND AMPHETAMINE SULFATE 5; 5; 5; 5 MG/1; MG/1; MG/1; MG/1
20 TABLET ORAL 2 TIMES DAILY
Qty: 60 TABLET | Refills: 0 | Status: SHIPPED | OUTPATIENT
Start: 2025-04-29 | End: 2025-05-29

## 2025-05-21 DIAGNOSIS — F90.2 ATTENTION DEFICIT HYPERACTIVITY DISORDER (ADHD), COMBINED TYPE: Chronic | ICD-10-CM

## 2025-05-21 RX ORDER — DEXTROAMPHETAMINE SACCHARATE, AMPHETAMINE ASPARTATE, DEXTROAMPHETAMINE SULFATE AND AMPHETAMINE SULFATE 5; 5; 5; 5 MG/1; MG/1; MG/1; MG/1
20 TABLET ORAL 2 TIMES DAILY
Qty: 60 TABLET | Refills: 0 | Status: SHIPPED | OUTPATIENT
Start: 2025-05-21 | End: 2025-05-23 | Stop reason: SDUPTHER

## 2025-05-23 ENCOUNTER — OFFICE VISIT (OUTPATIENT)
Dept: FAMILY MEDICINE | Facility: CLINIC | Age: 30
End: 2025-05-23
Payer: COMMERCIAL

## 2025-05-23 VITALS
WEIGHT: 189.31 LBS | OXYGEN SATURATION: 97 % | BODY MASS INDEX: 34.84 KG/M2 | HEART RATE: 78 BPM | DIASTOLIC BLOOD PRESSURE: 85 MMHG | HEIGHT: 62 IN | SYSTOLIC BLOOD PRESSURE: 123 MMHG

## 2025-05-23 DIAGNOSIS — Z76.0 MEDICATION REFILL: ICD-10-CM

## 2025-05-23 DIAGNOSIS — F90.2 ATTENTION DEFICIT HYPERACTIVITY DISORDER (ADHD), COMBINED TYPE: Primary | Chronic | ICD-10-CM

## 2025-05-23 RX ORDER — DEXTROAMPHETAMINE SACCHARATE, AMPHETAMINE ASPARTATE, DEXTROAMPHETAMINE SULFATE AND AMPHETAMINE SULFATE 5; 5; 5; 5 MG/1; MG/1; MG/1; MG/1
20 TABLET ORAL 2 TIMES DAILY
Start: 2025-05-23 | End: 2025-06-22

## 2025-05-23 NOTE — PROGRESS NOTES
Dennys Reed MD   [facility]  MEDICAL [facility] [address] [address]  [phone]      PATIENT NAME: Amy Fleming  : 1995  DATE: 25  MRN: [id number]      Billing Provider: Dennys Reed MD  Level of Service:   Patient PCP Information       Provider PCP Type    Dennys Reed MD General            Reason for Visit / Chief Complaint: Medication Refill       Update PCP  Update Chief Complaint         History of Present Illness / Problem Focused Workflow     Amy Fleming presents to the clinic with Medication Refill       29 yo WF here for follow up ADD.  Continues to do well on current med dosage without any reported adverse side effects.  Needs medication refilled.  No new/acute complaints.      Medication Refill  Pertinent negatives include no abdominal pain, chest pain, chills, coughing, fever, sore throat or weakness.       Review of Systems     Review of Systems   Constitutional:  Negative for activity change, chills and fever.   HENT:  Negative for sore throat.    Eyes:  Negative for pain.   Respiratory:  Negative for cough, chest tightness and shortness of breath.    Cardiovascular:  Negative for chest pain and palpitations.   Gastrointestinal:  Negative for abdominal pain.   Neurological:  Negative for dizziness, syncope and weakness.   Psychiatric/Behavioral:  Negative for confusion.         Medical / Social / Family History     Past Medical History:   Diagnosis Date    Abnormal Pap smear of cervix     LGSIL in 2019    ADHD     Herpes simplex virus (HSV) infection        Past Surgical History:   Procedure Laterality Date     SECTION  2023    DILATION AND CURETTAGE OF UTERUS         Social History    reports that she has never smoked. She has never been exposed to tobacco smoke. She has never used smokeless tobacco. She reports that she does not currently use alcohol. She reports that she does not  use drugs.   Social History[1]    Family History  Family History   Problem Relation Name Age of Onset    Lung cancer Paternal Grandfather Lung     Cancer Paternal Grandfather Lung     Breast cancer Maternal Grandmother      Hypertension Father Néstor Lonnie     Mental illness Brother Jermaine Fleming        Medications and Allergies     Medications  No outpatient medications have been marked as taking for the 5/23/25 encounter (Office Visit) with Dennys Reed MD.       Allergies  Review of patient's allergies indicates:   Allergen Reactions    Codeine        Physical Examination     Vitals:    05/23/25 1104   BP: 123/85   Pulse: 78     Physical Exam  Vitals reviewed.   Constitutional:       Appearance: Normal appearance.   HENT:      Head: Normocephalic and atraumatic.   Eyes:      Extraocular Movements: Extraocular movements intact.      Conjunctiva/sclera: Conjunctivae normal.      Pupils: Pupils are equal, round, and reactive to light.   Cardiovascular:      Rate and Rhythm: Normal rate and regular rhythm.      Heart sounds: Normal heart sounds.   Pulmonary:      Effort: Pulmonary effort is normal.      Breath sounds: Normal breath sounds.   Musculoskeletal:         General: Normal range of motion.      Cervical back: Normal range of motion.   Skin:     General: Skin is warm and dry.   Neurological:      General: No focal deficit present.      Mental Status: She is alert and oriented to person, place, and time.   Psychiatric:         Mood and Affect: Mood normal.         Behavior: Behavior normal.          Assessment and Plan (including Health Maintenance)      Problem List  Smart Sets  Document Outside HM   :    Plan: continue current med/dosage        Health Maintenance Due   Topic Date Due    Varicella Vaccines (2 of 2 - 2-dose childhood series) 06/24/1998    COVID-19 Vaccine (3 - 2024-25 season) 09/01/2024       Problem List Items Addressed This Visit          Psychiatric    Attention deficit hyperactivity  disorder (ADHD), combined type - Primary (Chronic)    Relevant Medications    dextroamphetamine-amphetamine (ADDERALL) 20 mg tablet     Other Visit Diagnoses         Medication refill                Health Maintenance Topics with due status: Not Due       Topic Last Completion Date    Influenza Vaccine 10/29/2018    TETANUS VACCINE 11/15/2018    DTaP/Tdap/Td Vaccines 11/15/2018    Cervical Cancer Screening 08/29/2024    RSV Vaccine (Age 60+ and Pregnant patients) Not Due       No future appointments.         Signature:  MD THOMAS Boyd [facility]  [facility]  [address] [place] [id number]  [phone]    Date of encounter: 5/23/25         [1]   Social History  Tobacco Use    Smoking status: Never     Passive exposure: Never    Smokeless tobacco: Never   Substance Use Topics    Alcohol use: Not Currently    Drug use: Never

## 2025-06-23 DIAGNOSIS — F90.2 ATTENTION DEFICIT HYPERACTIVITY DISORDER (ADHD), COMBINED TYPE: Chronic | ICD-10-CM

## 2025-06-24 RX ORDER — DEXTROAMPHETAMINE SACCHARATE, AMPHETAMINE ASPARTATE, DEXTROAMPHETAMINE SULFATE AND AMPHETAMINE SULFATE 5; 5; 5; 5 MG/1; MG/1; MG/1; MG/1
20 TABLET ORAL 2 TIMES DAILY
Qty: 60 TABLET | Refills: 0 | Status: SHIPPED | OUTPATIENT
Start: 2025-06-24 | End: 2025-07-24

## 2025-07-21 DIAGNOSIS — F90.2 ATTENTION DEFICIT HYPERACTIVITY DISORDER (ADHD), COMBINED TYPE: Chronic | ICD-10-CM

## 2025-07-22 RX ORDER — DEXTROAMPHETAMINE SACCHARATE, AMPHETAMINE ASPARTATE, DEXTROAMPHETAMINE SULFATE AND AMPHETAMINE SULFATE 5; 5; 5; 5 MG/1; MG/1; MG/1; MG/1
20 TABLET ORAL 2 TIMES DAILY
Qty: 60 TABLET | Refills: 0 | Status: SHIPPED | OUTPATIENT
Start: 2025-07-22 | End: 2025-08-21

## 2025-08-22 ENCOUNTER — PATIENT MESSAGE (OUTPATIENT)
Dept: FAMILY MEDICINE | Facility: CLINIC | Age: 30
End: 2025-08-22
Payer: COMMERCIAL

## 2025-08-27 ENCOUNTER — OFFICE VISIT (OUTPATIENT)
Dept: FAMILY MEDICINE | Facility: CLINIC | Age: 30
End: 2025-08-27
Payer: COMMERCIAL

## 2025-08-27 VITALS
OXYGEN SATURATION: 98 % | WEIGHT: 198.31 LBS | DIASTOLIC BLOOD PRESSURE: 86 MMHG | HEART RATE: 67 BPM | HEIGHT: 62 IN | BODY MASS INDEX: 36.49 KG/M2 | SYSTOLIC BLOOD PRESSURE: 136 MMHG

## 2025-08-27 DIAGNOSIS — Z76.0 ISSUE OF REPEAT PRESCRIPTION: ICD-10-CM

## 2025-08-27 DIAGNOSIS — F90.2 ATTENTION DEFICIT HYPERACTIVITY DISORDER (ADHD), COMBINED TYPE: Primary | Chronic | ICD-10-CM

## 2025-08-27 PROCEDURE — 1160F RVW MEDS BY RX/DR IN RCRD: CPT | Mod: ,,, | Performed by: FAMILY MEDICINE

## 2025-08-27 PROCEDURE — 3075F SYST BP GE 130 - 139MM HG: CPT | Mod: ,,, | Performed by: FAMILY MEDICINE

## 2025-08-27 PROCEDURE — 3079F DIAST BP 80-89 MM HG: CPT | Mod: ,,, | Performed by: FAMILY MEDICINE

## 2025-08-27 PROCEDURE — 1159F MED LIST DOCD IN RCRD: CPT | Mod: ,,, | Performed by: FAMILY MEDICINE

## 2025-08-27 PROCEDURE — 3008F BODY MASS INDEX DOCD: CPT | Mod: ,,, | Performed by: FAMILY MEDICINE

## 2025-08-27 PROCEDURE — 99213 OFFICE O/P EST LOW 20 MIN: CPT | Mod: ,,, | Performed by: FAMILY MEDICINE

## 2025-08-27 RX ORDER — DEXTROAMPHETAMINE SACCHARATE, AMPHETAMINE ASPARTATE, DEXTROAMPHETAMINE SULFATE AND AMPHETAMINE SULFATE 5; 5; 5; 5 MG/1; MG/1; MG/1; MG/1
20 TABLET ORAL 2 TIMES DAILY
Qty: 60 TABLET | Refills: 0 | Status: SHIPPED | OUTPATIENT
Start: 2025-08-27 | End: 2025-09-26